# Patient Record
Sex: FEMALE | Race: OTHER | NOT HISPANIC OR LATINO
[De-identification: names, ages, dates, MRNs, and addresses within clinical notes are randomized per-mention and may not be internally consistent; named-entity substitution may affect disease eponyms.]

---

## 2017-03-24 ENCOUNTER — APPOINTMENT (OUTPATIENT)
Dept: CARDIOLOGY | Facility: CLINIC | Age: 62
End: 2017-03-24

## 2018-12-14 ENCOUNTER — NON-APPOINTMENT (OUTPATIENT)
Age: 63
End: 2018-12-14

## 2018-12-14 ENCOUNTER — APPOINTMENT (OUTPATIENT)
Dept: CARDIOLOGY | Facility: CLINIC | Age: 63
End: 2018-12-14
Payer: COMMERCIAL

## 2018-12-14 VITALS — HEART RATE: 56 BPM | OXYGEN SATURATION: 99 % | SYSTOLIC BLOOD PRESSURE: 95 MMHG | DIASTOLIC BLOOD PRESSURE: 49 MMHG

## 2018-12-14 VITALS — HEART RATE: 64 BPM | SYSTOLIC BLOOD PRESSURE: 97 MMHG | DIASTOLIC BLOOD PRESSURE: 53 MMHG | OXYGEN SATURATION: 99 %

## 2018-12-14 VITALS — HEART RATE: 64 BPM | DIASTOLIC BLOOD PRESSURE: 54 MMHG | OXYGEN SATURATION: 98 % | SYSTOLIC BLOOD PRESSURE: 102 MMHG

## 2018-12-14 VITALS
OXYGEN SATURATION: 99 % | DIASTOLIC BLOOD PRESSURE: 52 MMHG | HEIGHT: 66 IN | HEART RATE: 58 BPM | BODY MASS INDEX: 23.3 KG/M2 | RESPIRATION RATE: 12 BRPM | WEIGHT: 145 LBS | SYSTOLIC BLOOD PRESSURE: 101 MMHG

## 2018-12-14 VITALS — DIASTOLIC BLOOD PRESSURE: 53 MMHG | SYSTOLIC BLOOD PRESSURE: 95 MMHG | HEART RATE: 63 BPM | OXYGEN SATURATION: 97 %

## 2018-12-14 DIAGNOSIS — H81.10 BENIGN PAROXYSMAL VERTIGO, UNSPECIFIED EAR: ICD-10-CM

## 2018-12-14 DIAGNOSIS — R91.8 OTHER NONSPECIFIC ABNORMAL FINDING OF LUNG FIELD: ICD-10-CM

## 2018-12-14 DIAGNOSIS — R07.9 CHEST PAIN, UNSPECIFIED: ICD-10-CM

## 2018-12-14 DIAGNOSIS — Z84.89 FAMILY HISTORY OF OTHER SPECIFIED CONDITIONS: ICD-10-CM

## 2018-12-14 DIAGNOSIS — Z82.3 FAMILY HISTORY OF STROKE: ICD-10-CM

## 2018-12-14 PROCEDURE — 93040 RHYTHM ECG WITH REPORT: CPT | Mod: 59

## 2018-12-14 PROCEDURE — 36415 COLL VENOUS BLD VENIPUNCTURE: CPT

## 2018-12-14 PROCEDURE — 99205 OFFICE O/P NEW HI 60 MIN: CPT

## 2018-12-14 PROCEDURE — 93000 ELECTROCARDIOGRAM COMPLETE: CPT

## 2018-12-15 LAB
25(OH)D3 SERPL-MCNC: 16.2 NG/ML
ALBUMIN SERPL ELPH-MCNC: 4.7 G/DL
ALP BLD-CCNC: 63 U/L
ALT SERPL-CCNC: 17 U/L
ANION GAP SERPL CALC-SCNC: 12 MMOL/L
APPEARANCE: CLEAR
AST SERPL-CCNC: 25 U/L
BACTERIA: NEGATIVE
BASOPHILS # BLD AUTO: 0.02 K/UL
BASOPHILS NFR BLD AUTO: 0.3 %
BILIRUB SERPL-MCNC: 0.4 MG/DL
BILIRUBIN URINE: NEGATIVE
BLOOD URINE: NEGATIVE
BUN SERPL-MCNC: 10 MG/DL
CALCIUM SERPL-MCNC: 9.2 MG/DL
CHLORIDE SERPL-SCNC: 103 MMOL/L
CHOLEST SERPL-MCNC: 219 MG/DL
CHOLEST/HDLC SERPL: 4.1 RATIO
CO2 SERPL-SCNC: 27 MMOL/L
COLOR: YELLOW
CREAT SERPL-MCNC: 0.63 MG/DL
CREAT SPEC-SCNC: 134 MG/DL
EOSINOPHIL # BLD AUTO: 0.12 K/UL
EOSINOPHIL NFR BLD AUTO: 2.1 %
GLUCOSE QUALITATIVE U: NEGATIVE MG/DL
GLUCOSE SERPL-MCNC: 65 MG/DL
HBA1C MFR BLD HPLC: 5.8 %
HCT VFR BLD CALC: 40.2 %
HDLC SERPL-MCNC: 54 MG/DL
HGB BLD-MCNC: 13.1 G/DL
HYALINE CASTS: 1 /LPF
IMM GRANULOCYTES NFR BLD AUTO: 0.2 %
KETONES URINE: NEGATIVE
LDLC SERPL CALC-MCNC: 146 MG/DL
LEUKOCYTE ESTERASE URINE: NEGATIVE
LYMPHOCYTES # BLD AUTO: 1.87 K/UL
LYMPHOCYTES NFR BLD AUTO: 32 %
MAN DIFF?: NORMAL
MCHC RBC-ENTMCNC: 30.3 PG
MCHC RBC-ENTMCNC: 32.6 GM/DL
MCV RBC AUTO: 93.1 FL
MICROALBUMIN 24H UR DL<=1MG/L-MCNC: <1.2 MG/DL
MICROALBUMIN/CREAT 24H UR-RTO: NORMAL
MICROSCOPIC-UA: NORMAL
MONOCYTES # BLD AUTO: 0.46 K/UL
MONOCYTES NFR BLD AUTO: 7.9 %
NEUTROPHILS # BLD AUTO: 3.36 K/UL
NEUTROPHILS NFR BLD AUTO: 57.5 %
NITRITE URINE: NEGATIVE
PH URINE: 6
PLATELET # BLD AUTO: 276 K/UL
POTASSIUM SERPL-SCNC: 3.5 MMOL/L
PROT SERPL-MCNC: 7.7 G/DL
PROTEIN URINE: NEGATIVE MG/DL
RBC # BLD: 4.32 M/UL
RBC # FLD: 13.9 %
RED BLOOD CELLS URINE: 1 /HPF
SODIUM SERPL-SCNC: 142 MMOL/L
SPECIFIC GRAVITY URINE: 1.02
SQUAMOUS EPITHELIAL CELLS: 2 /HPF
T4 FREE SERPL-MCNC: 0.9 NG/DL
TRIGL SERPL-MCNC: 93 MG/DL
TSH SERPL-ACNC: 7.67 UIU/ML
UROBILINOGEN URINE: NEGATIVE MG/DL
WBC # FLD AUTO: 5.84 K/UL
WHITE BLOOD CELLS URINE: 0 /HPF

## 2018-12-15 RX ORDER — CYCLOBENZAPRINE HYDROCHLORIDE 10 MG/1
10 TABLET, FILM COATED ORAL
Qty: 21 | Refills: 0 | Status: COMPLETED | COMMUNITY
Start: 2018-02-17

## 2018-12-15 RX ORDER — AMOXICILLIN 500 MG/1
500 TABLET, FILM COATED ORAL
Qty: 12 | Refills: 0 | Status: COMPLETED | COMMUNITY
Start: 2018-08-22

## 2018-12-16 PROBLEM — R91.8 MULTIPLE LUNG NODULES ON CT: Status: ACTIVE | Noted: 2018-12-14

## 2018-12-16 PROBLEM — Z84.89 FAMILY HISTORY OF SEIZURES: Status: ACTIVE | Noted: 2018-12-14

## 2018-12-16 PROBLEM — Z82.3 FAMILY HISTORY OF STROKE: Status: ACTIVE | Noted: 2018-12-14

## 2018-12-16 NOTE — DISCUSSION/SUMMARY
[FreeTextEntry1] : \par WEIGHT GOALS:\par \par Category				BMI range - kg/m2	BMI Prime\par Very severely underweight		less than 15		less than 0.60	\par Severely underweight			from 15.0 to 16.0		from 0.60 to 0.64	\par Underweight				from 16.0 to 18.5		from 0.64 to 0.74	\par Normal (healthy weight)			from 18.5 to 25		from 0.74 to 1.0	\par Overweight				from 25 to 30		from 1.0 to 1.2	\par Obese Class I (Moderately obese)		from 30 to 35		from 1.2 to 1.4	\par Obese Class II (Severely obese)		from 35 to 40		from 1.4 to 1.6	\par Obese Class III (Very severely obese)	over 40			over 1.6	\par \par Your current weight is 145 lbs. Given current weight and height 5'6", your calculated body mass index (BMI) is 23.4 kg/sqm. Normal BMI is 18.5-25 kg/sqm. Thus current weight is in the normal category. Abdominal waist circumference is measured at the level of the umbilicus and is thus not a pants waist measurement. Your current abdominal waist circumference is 33 inches. Normal abdominal waist circumference is < 32 inches for women and < 37 inches for men\par \par SMOKING CESSATION:\par We all know the health risks of smoking, and most of us know that kicking the habit is the single biggest improvement to health a smoker can make. But that doesn’t make it any easier to kick the habit. Whether you’re a teen smoker or a lifetime pack-a-day smoker, quitting can be tough.\par To increase your chances of success, you need to be motivated, have social support, an understanding of what to expect, and a personal game plan. It is possible to learn how to replace your smoking habits, manage your cravings, and join the millions of people who have kicked the habit for good.\par Smoking tobacco is both a psychological habit and a physical addiction. The act of smoking is ingrained as a daily ritual and, at the same time, the nicotine from cigarettes provides a temporary, and addictive, high. Eliminating that regular fix of nicotine will cause your body to experience physical withdrawal symptoms and cravings. To successfully quit smoking, you’ll need to address both the habit and the addiction by changing your behavior and dealing with nicotine withdrawal symptoms.\par Relieving unpleasant and overwhelming feelings without cigarettes\par Managing unpleasant feelings such as stress, depression, loneliness, fear, and anxiety are some of the most common reasons why adults smoke. When you have a bad day, it can seem like your cigarettes are your only friend. Smoking can temporarily make feelings such as sadness, stress, anxiety, depression, and boredom evaporate into thin air. As much comfort as cigarettes provide, though, it’s important to remember that there are healthier (and more effective) ways to keep unpleasant feelings in check. These may include exercising, meditating, using sensory relaxation strategies, and practicing simple breathing exercises. \par For many people, an important aspect of quitting smoking is to find alternate ways to handle these difficult feelings without smoking. Even when cigarettes are no longer a part of your life, the painful and unpleasant feelings that may have prompted you to smoke in the past will still remain. So, it’s worth spending some time thinking about the different ways you intend to deal with stressful situations and the daily irritations that would normally have you reaching for a cigarette.\par Tailoring a personal game plan to your specific needs and desires can be a big help. List the reasons why you want to quit and then keep copies of the list in the places where you’d normally keep your cigarettes, such as in your jacket, purse, or car. Your reasons for quitting smoking might include:\par I will feel healthier and have more energy, whiter teeth and fresher breath.\par I will lower my risk for cancer, heart attacks, strokes, early death, cataracts, and skin wrinkling.\par I will make myself and my partner, friends, and family proud of me.\par I will no longer expose my children and others to the dangers of my second-hand smoke.\par I will have a healthier baby (If you or your partner is pregnant).\par I will have more money to spend.\par I won't have to worry: "When will I get to smoke next?"\par Questions to ask yourself\par To successfully detach from smoking, you will need to identify and address your smoking habits, the true nature of your dependency, and the techniques that work for you. These types of questions can help:\par Do you feel the need to smoke at every meal?\par Are you more of a social smoker?\par Is it a very bad addiction (more than a pack a day)? Or would a simple nicotine patch do the job?\par Is your cigarette smoking linked to other addictions, such as alcohol or gambling?\par Are you open to hypnotherapy and/or acupuncture?\par Are you someone who is open to talking about your addiction with a therapist or counselor?\par Are you interested in getting into a fitness program?\par Take the time to think of what kind of smoker you are, which moments of your life call for a cigarette, and why. This will help you to identify which tips, techniques or therapies may be most beneficial for you. \par Start your stop smoking plan with START\par S = Set a quit date.\par T = Tell family, friends, and co-workers that you plan to quit.\par A = Anticipate and plan for the challenges you'll face while quitting.\par R = Remove cigarettes and other tobacco products from your home, car, and work.\par T = Talk to your doctor about getting help to quit.\par \par After quitting, you may feel dizzy, restless, or even have strong headaches because you’re lacking the immediate release of sugar that comes from nicotine. You may also have a bigger appetite. These sugar-related cravings should only last a few days until your body adjusts so keep your sugar levels a bit higher than usual on those days by drinking plenty of juice (unless you’re a diabetic). It will help prevent the craving symptoms and help your body re-adjust back to normal.\par Tips for managing other cigarette cravings\par Cravings associated with meals\par For some smokers, ending a meal means lighting up, and the prospect of giving that up may appear daunting. TIP: replace that moment after a meal with something such as a piece of fruit, a (healthy) dessert, a square of chocolate, or a stick of gum.\par \par Alcohol and cigarettes\par Many people have a habit of smoking when they have an alcoholic drink. TIP: try non-alcoholic drinks, or try drinking only in bars, restaurant-bars, or friends’ houses where smoking inside is prohibited. Or try snacking on nuts and chips, or chewing on a straw or cocktail stick.\par \par Cravings associated with social smoking\par When friends, family, and co-workers smoke around you, it is doubly difficult to quit or avoid relapse. TIP: Your social circles need to know that you are changing your habits so talk about your decision to quit. Let them know they won’t be able to smoke when you’re in the car with them or taking a coffee break together. \par \par In your workplace, don’t take all your coffee breaks with smokers only, do something else instead, or find non-smokers to have your breaks with.\par Additional tips to deal with cravings and withdrawal symptoms\par Stay active: Keep yourself distracted and occupied, go for walks.\par Keep your hands/fingers busy: Squeeze balls, pencils, or paper clips are good substitutes to satisfy that need for tactile stimulation.\par Keep your mind busy: Read a book or magazine, listen to some music you love.\par Find an oral substitute: Keep other things around to pop in your mouth when you’re craving a cigarette.\par Good choices include mints, hard candy, carrot or celery sticks, gum, and sunflower seeds.\par Drink lots of water: Flushing toxins from your body minimizes withdrawal symptoms and helps cravings pass faster.\par Look for new ways to relax and to cope with depression or anxiety: There are a lot of ways to improve your mood without smoking. \par \par Finding the resources and support to quit smoking\par There are many different methods that have successfully helped people to quit smoking, including:\par Quitting smoking cold turkey.\par Systematically decreasing the number of cigarettes you smoke.\par Reducing your intake of nicotine gradually over time.\par Using nicotine replacement therapy or non-nicotine medications to reduce withdrawal symptoms.\par Utilizing nicotine support groups.\par Trying hypnosis, acupuncture, or counseling using cognitive behavioral techniques.\par You may be successful with the first method you try. More likely, you’ll have to try a number of different methods or a combination of treatments to find the ones that work best for you.\par \par Medication therapy\par Smoking cessation medications can ease withdrawal symptoms and reduce cravings, and are most effective when used as part of a comprehensive stop smoking program monitored by your physician. Talk to your doctor about your options and whether an anti-smoking medication is right for you. [place] Food and Drug Administration (FDA) approved options are: \par \par Nicotine Replacement Therapy\par Nicotine replacement therapy involves "replacing" cigarettes with other nicotine substitutes, such as nicotine gum or a nicotine patch. It works by delivering small and steady doses of nicotine into the body to relieve some of the withdrawal symptoms without the tars and poisonous gases found in cigarettes. This type of treatment helps smokers focus on breaking their psychological addiction and makes it easier to concentrate on learning new behaviors and coping skills.\par \par Non-Nicotine Medication\par These medications help you stop smoking by reducing cravings and withdrawal symptoms without the use of nicotine. Medications such as bupropion (Zyban) and varenicline (Chantix) are intended for short-term use only.\par \par Non-medication therapies\par There are several things you can do to stop smoking that don’t involve nicotine replacement therapy or prescription medications:\par Hypnosis\par A popular option that has produced good results. Forget anything you may have seen from stage hypnotists, hypnosis works by getting you into a deeply relaxed state where you are open to suggestions that strengthen your resolve to quit smoking and increase your negative feelings toward cigarettes. Ask your doctor to recommend a qualified smoking cessation hypnotherapist in your area or refer to the American Society of Clinical Hypnosis (ASCH) for guidelines on selecting a qualified professional.\par Acupuncture\par One of the oldest known medical techniques, acupuncture is believed to work by triggering the release of endorphins (natural pain relievers) that allow the body to relax. As a smoking cessation aid, acupuncture can be helpful in managing smoking withdrawal symptoms. Ask your doctor for a referral or search for a local practitioner at the American Association [employer] and [place] [employer] (AAAOM).\par Behavioral Therapy\par Nicotine addiction is related to the habitual behaviors (the “rituals”) involved in smoking. Behavior therapy focuses on learning new coping skills and breaking those habits. The American [employer] Association offers a free online smoking cessation program that focuses on behavioral change. To find a local behavioral therapist, check with your doctor or search at the Association for Behavioral and Cognitive Therapies (ABCT).\par Motivational Therapies\par Self-help books and websites can provide a number of ways to motivate yourself to quit smoking. One well known example is calculating the monetary savings. Some people have been able to find the motivation to quit just by calculating how much money they will save after they quit. It may be enough to pay for a summer vacation.\par \par [facility]\par [address]\[place] [address]\par [phone]\par [email or web address]/[email or web address]\par \par GLYCEMIC INDEX:\par Foods can be measured by how much they are likely to raise blood sugar. This is called the glycemic index. We want you to eat mostly foods that have a low glycemic index. \par Fruit: choose cherries, grapefruit, prunes, dried apricots, apples, peaches, pears, blueberries, strawberries, oranges, and grapes.  \par Breads and other starches: Choose pumpernickel, rye, sourdough, or stone-ground whole wheat bread. For cereal, choose bran flakes, oat flakes, and oatmeal. For rice, use long-grained white rice. Most pasta is fairly low on the glycemic index; whole wheat or egg pasta is the lowest. Choose new or sweet potatoes and yams.  \par Dairy products: Dairy tends to be fairly low on the glycemic index because of the protein and fat in it. Premium ice cream is lower on the glycemic index than low-fat ice cream.  \par Salty snacks: Hummus, peanuts, walnuts, and cashews are all good choices.  \par Sweets: Most sweets are high on the glycemic index, so make them special treats only. Ones that have protein and fat in them tend to be a bit lower. Milk chocolate or peanut candies are good choices. Pound and sponge cakes are lower on the glycemic index than other types of cakes. For cookies, choose peanut butter, chocolate chip, butter, and oatmeal cookies. For a breakfast treat, choose scones or oatmeal muffins. \par Beans: Choose angely, chickpeas (garbanzo beans), lentils, split peas, lima beans, and kidney beans. \par Meat and vegetables are low on the glycemic index. Soups and stews made with meat or vegetables are also good.\par

## 2018-12-16 NOTE — PHYSICAL EXAM
[General Appearance - Well Developed] : well developed [Normal Appearance] : normal appearance [Well Groomed] : well groomed [General Appearance - Well Nourished] : well nourished [No Deformities] : no deformities [General Appearance - In No Acute Distress] : no acute distress [Normal Conjunctiva] : the conjunctiva exhibited no abnormalities [Eyelids - No Xanthelasma] : the eyelids demonstrated no xanthelasmas [Normal Oral Mucosa] : normal oral mucosa [No Oral Pallor] : no oral pallor [No Oral Cyanosis] : no oral cyanosis [Normal Jugular Venous A Waves Present] : normal jugular venous A waves present [Normal Jugular Venous V Waves Present] : normal jugular venous V waves present [No Jugular Venous Pena A Waves] : no jugular venous pena A waves [5th Left ICS - MCL] : palpated at the 5th LICS in the midclavicular line [Normal] : normal [No Precordial Heave] : no precordial heave was noted [Normal Rate] : normal [Rhythm Regular] : regular [Normal S1] : normal S1 [Normal S2] : normal S2 [No Gallop] : no gallop heard [No Murmur] : no murmurs heard [2+] : left 2+ [No Abnormalities] : the abdominal aorta was not enlarged and no bruit was heard [No Pitting Edema] : no pitting edema present [Abnormal Walk] : normal gait [Gait - Sufficient For Exercise Testing] : the gait was sufficient for exercise testing [Nail Clubbing] : no clubbing of the fingernails [Cyanosis, Localized] : no localized cyanosis [Petechial Hemorrhages (___cm)] : no petechial hemorrhages [Skin Color & Pigmentation] : normal skin color and pigmentation [No Venous Stasis] : no venous stasis [Skin Lesions] : no skin lesions [No Skin Ulcers] : no skin ulcer [No Xanthoma] : no  xanthoma was observed [Oriented To Time, Place, And Person] : oriented to person, place, and time [Affect] : the affect was normal [Mood] : the mood was normal [No Anxiety] : not feeling anxious [Abdomen Soft] : soft [Abdomen Tenderness] : non-tender [] : no hepato-splenomegaly [Abdomen Mass (___ Cm)] : no abdominal mass palpated [Prolonged Exp Time] : with normal expiratory time [Increased E/I Ratio] : with normal expiratory/inspiratory ratio  [Apical Thrill] : no thrill palpable at the apex [Click] : no click [Pericardial Rub] : no pericardial rub [Right Carotid Bruit] : no bruit heard over the right carotid [Left Carotid Bruit] : no bruit heard over the left carotid [Rt] : no varicose veins of the right leg [Lt] : no varicose veins of the left leg [FreeTextEntry1] : ankle circumference 7 inches right and 7.25 inches right

## 2018-12-16 NOTE — ADDENDUM
[FreeTextEntry1] : I spent 60 minutes face to face time with the patient, from 11:00 to 12:00 on 12/14/18. Thirty minutes were devoted to patient counseling as outlined above in the End of Visit section. Prior to this visit, I obtained and reviewed echocardiogram report done 11/13/17.
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
## 2018-12-16 NOTE — REASON FOR VISIT
[Follow-Up - Clinic] : a clinic follow-up of [Abnormal Test Result] : an abnormal test result [Chest Pain] : chest pain [Hyperlipidemia] : hyperlipidemia

## 2019-01-18 ENCOUNTER — APPOINTMENT (OUTPATIENT)
Dept: CV DIAGNOSTICS | Facility: HOSPITAL | Age: 64
End: 2019-01-18

## 2019-04-22 ENCOUNTER — APPOINTMENT (OUTPATIENT)
Dept: CV DIAGNOSTICS | Facility: HOSPITAL | Age: 64
End: 2019-04-22

## 2019-05-24 ENCOUNTER — APPOINTMENT (OUTPATIENT)
Dept: CV DIAGNOSTICS | Facility: HOSPITAL | Age: 64
End: 2019-05-24
Payer: COMMERCIAL

## 2019-05-24 ENCOUNTER — OUTPATIENT (OUTPATIENT)
Dept: OUTPATIENT SERVICES | Facility: HOSPITAL | Age: 64
LOS: 1 days | End: 2019-05-24

## 2019-05-24 DIAGNOSIS — R07.89 OTHER CHEST PAIN: ICD-10-CM

## 2019-05-24 DIAGNOSIS — E78.5 HYPERLIPIDEMIA, UNSPECIFIED: ICD-10-CM

## 2019-05-24 DIAGNOSIS — F17.200 NICOTINE DEPENDENCE, UNSPECIFIED, UNCOMPLICATED: ICD-10-CM

## 2019-05-24 DIAGNOSIS — R73.09 OTHER ABNORMAL GLUCOSE: ICD-10-CM

## 2019-05-24 DIAGNOSIS — R06.09 OTHER FORMS OF DYSPNEA: ICD-10-CM

## 2019-05-24 PROCEDURE — 78452 HT MUSCLE IMAGE SPECT MULT: CPT | Mod: 26

## 2019-05-24 PROCEDURE — 93018 CV STRESS TEST I&R ONLY: CPT | Mod: GC

## 2019-05-24 PROCEDURE — 93016 CV STRESS TEST SUPVJ ONLY: CPT | Mod: GC

## 2019-06-04 ENCOUNTER — RESULT REVIEW (OUTPATIENT)
Age: 64
End: 2019-06-04

## 2019-06-04 ENCOUNTER — RESULT CHARGE (OUTPATIENT)
Age: 64
End: 2019-06-04

## 2019-11-08 ENCOUNTER — APPOINTMENT (OUTPATIENT)
Dept: CARDIOLOGY | Facility: CLINIC | Age: 64
End: 2019-11-08
Payer: COMMERCIAL

## 2019-11-08 ENCOUNTER — NON-APPOINTMENT (OUTPATIENT)
Age: 64
End: 2019-11-08

## 2019-11-08 VITALS — OXYGEN SATURATION: 100 % | HEART RATE: 62 BPM | SYSTOLIC BLOOD PRESSURE: 97 MMHG | DIASTOLIC BLOOD PRESSURE: 45 MMHG

## 2019-11-08 VITALS
DIASTOLIC BLOOD PRESSURE: 52 MMHG | OXYGEN SATURATION: 100 % | SYSTOLIC BLOOD PRESSURE: 103 MMHG | HEIGHT: 66.75 IN | HEART RATE: 62 BPM | RESPIRATION RATE: 12 BRPM | WEIGHT: 147 LBS | BODY MASS INDEX: 23.07 KG/M2

## 2019-11-08 VITALS — DIASTOLIC BLOOD PRESSURE: 52 MMHG | OXYGEN SATURATION: 100 % | SYSTOLIC BLOOD PRESSURE: 102 MMHG | HEART RATE: 64 BPM

## 2019-11-08 VITALS — OXYGEN SATURATION: 99 % | SYSTOLIC BLOOD PRESSURE: 95 MMHG | HEART RATE: 68 BPM | DIASTOLIC BLOOD PRESSURE: 49 MMHG

## 2019-11-08 VITALS — SYSTOLIC BLOOD PRESSURE: 102 MMHG | HEART RATE: 67 BPM | DIASTOLIC BLOOD PRESSURE: 56 MMHG | OXYGEN SATURATION: 99 %

## 2019-11-08 VITALS — OXYGEN SATURATION: 98 % | SYSTOLIC BLOOD PRESSURE: 106 MMHG | DIASTOLIC BLOOD PRESSURE: 59 MMHG | HEART RATE: 68 BPM

## 2019-11-08 VITALS — SYSTOLIC BLOOD PRESSURE: 93 MMHG | HEART RATE: 70 BPM | DIASTOLIC BLOOD PRESSURE: 56 MMHG | OXYGEN SATURATION: 100 %

## 2019-11-08 VITALS — SYSTOLIC BLOOD PRESSURE: 129 MMHG | DIASTOLIC BLOOD PRESSURE: 69 MMHG | OXYGEN SATURATION: 99 % | HEART RATE: 65 BPM

## 2019-11-08 DIAGNOSIS — Z00.00 ENCOUNTER FOR GENERAL ADULT MEDICAL EXAMINATION W/OUT ABNORMAL FINDINGS: ICD-10-CM

## 2019-11-08 DIAGNOSIS — K21.0 GASTRO-ESOPHAGEAL REFLUX DISEASE WITH ESOPHAGITIS: ICD-10-CM

## 2019-11-08 LAB
25(OH)D3 SERPL-MCNC: 18.1 NG/ML
ALBUMIN SERPL ELPH-MCNC: 4.8 G/DL
ALP BLD-CCNC: 71 U/L
ALT SERPL-CCNC: 24 U/L
APPEARANCE: CLEAR
AST SERPL-CCNC: 29 U/L
BACTERIA: NEGATIVE
BASOPHILS # BLD AUTO: 0.06 K/UL
BASOPHILS NFR BLD AUTO: 0.9 %
BILIRUB SERPL-MCNC: 0.3 MG/DL
BILIRUBIN URINE: NEGATIVE
BLOOD URINE: NEGATIVE
BUN SERPL-MCNC: 13 MG/DL
CALCIUM SERPL-MCNC: 9.7 MG/DL
CHLORIDE SERPL-SCNC: 101 MMOL/L
CO2 SERPL-SCNC: 26 MMOL/L
COLOR: YELLOW
CREAT SERPL-MCNC: 0.61 MG/DL
CREAT SPEC-SCNC: 147 MG/DL
CRP SERPL HS-MCNC: 0.95 MG/L
EOSINOPHIL # BLD AUTO: 0.39 K/UL
EOSINOPHIL NFR BLD AUTO: 5.8 %
ESTIMATED AVERAGE GLUCOSE: 117 MG/DL
GLUCOSE QUALITATIVE U: NEGATIVE
GLUCOSE SERPL-MCNC: 82 MG/DL
HBA1C MFR BLD HPLC: 5.7 %
HCT VFR BLD CALC: 40 %
HGB BLD-MCNC: 12.9 G/DL
HYALINE CASTS: 4 /LPF
IMM GRANULOCYTES NFR BLD AUTO: 0.1 %
KETONES URINE: NEGATIVE
LEUKOCYTE ESTERASE URINE: NEGATIVE
LYMPHOCYTES # BLD AUTO: 1.57 K/UL
LYMPHOCYTES NFR BLD AUTO: 23.3 %
MAN DIFF?: NORMAL
MCHC RBC-ENTMCNC: 30.6 PG
MCHC RBC-ENTMCNC: 32.3 GM/DL
MCV RBC AUTO: 94.8 FL
MICROALBUMIN 24H UR DL<=1MG/L-MCNC: <1.2 MG/DL
MICROALBUMIN/CREAT 24H UR-RTO: NORMAL MG/G
MICROSCOPIC-UA: NORMAL
MONOCYTES # BLD AUTO: 0.63 K/UL
MONOCYTES NFR BLD AUTO: 9.3 %
NEUTROPHILS # BLD AUTO: 4.08 K/UL
NEUTROPHILS NFR BLD AUTO: 60.6 %
NITRITE URINE: NEGATIVE
PH URINE: 6.5
PLATELET # BLD AUTO: 275 K/UL
POTASSIUM SERPL-SCNC: 4.1 MMOL/L
PROT SERPL-MCNC: 7.9 G/DL
PROTEIN URINE: NEGATIVE
RBC # BLD: 4.22 M/UL
RBC # FLD: 12.8 %
RED BLOOD CELLS URINE: 2 /HPF
SODIUM SERPL-SCNC: 142 MMOL/L
SPECIFIC GRAVITY URINE: 1.02
SQUAMOUS EPITHELIAL CELLS: 3 /HPF
T4 FREE SERPL-MCNC: 1 NG/DL
TSH SERPL-ACNC: 5.36 UIU/ML
UROBILINOGEN URINE: NORMAL
WBC # FLD AUTO: 6.74 K/UL
WHITE BLOOD CELLS URINE: 0 /HPF

## 2019-11-08 PROCEDURE — 93040 RHYTHM ECG WITH REPORT: CPT | Mod: 59

## 2019-11-08 PROCEDURE — 36415 COLL VENOUS BLD VENIPUNCTURE: CPT

## 2019-11-08 PROCEDURE — 99215 OFFICE O/P EST HI 40 MIN: CPT

## 2019-11-08 PROCEDURE — 93000 ELECTROCARDIOGRAM COMPLETE: CPT

## 2019-11-08 RX ORDER — ATORVASTATIN CALCIUM 20 MG/1
20 TABLET, FILM COATED ORAL
Qty: 90 | Refills: 3 | Status: DISCONTINUED | COMMUNITY
Start: 2018-12-16 | End: 2019-11-08

## 2019-11-08 RX ORDER — UBIDECARENONE 200 MG
200 CAPSULE ORAL
Qty: 90 | Refills: 0 | Status: DISCONTINUED | COMMUNITY
Start: 2018-12-16 | End: 2019-11-08

## 2019-11-08 NOTE — DISCUSSION/SUMMARY
[FreeTextEntry1] : \par WEIGHT GOALS:\par \par Category				BMI range - kg/m2	BMI Prime\par Very severely underweight		less than 15		less than 0.60	\par Severely underweight			from 15.0 to 16.0		from 0.60 to 0.64	\par Underweight				from 16.0 to 18.5		from 0.64 to 0.74	\par Normal (healthy weight)			from 18.5 to 25		from 0.74 to 1.0	\par Overweight				from 25 to 30		from 1.0 to 1.2	\par Obese Class I (Moderately obese)		from 30 to 35		from 1.2 to 1.4	\par Obese Class II (Severely obese)		from 35 to 40		from 1.4 to 1.6	\par Obese Class III (Very severely obese)	over 40			over 1.6	\par \par Your current weight is 147 lbs (145 lbs on 12/14/18). Given current weight and height 5'6.75", your calculated body mass index (BMI) is 23.2 kg/sqm. Normal BMI is 18.5-25 kg/sqm. Thus current weight is in the normal category. Abdominal waist circumference is measured at the level of the umbilicus and is thus not a pants waist measurement. Your current abdominal waist circumference is 29 inches. Normal abdominal waist circumference is < 32 inches for women and < 37 inches for men\par \par SMOKING CESSATION:\par We all know the health risks of smoking, and most of us know that kicking the habit is the single biggest improvement to health a smoker can make. But that doesn’t make it any easier to kick the habit. Whether you’re a teen smoker or a lifetime pack-a-day smoker, quitting can be tough.\par To increase your chances of success, you need to be motivated, have social support, an understanding of what to expect, and a personal game plan. It is possible to learn how to replace your smoking habits, manage your cravings, and join the millions of people who have kicked the habit for good.\par Smoking tobacco is both a psychological habit and a physical addiction. The act of smoking is ingrained as a daily ritual and, at the same time, the nicotine from cigarettes provides a temporary, and addictive, high. Eliminating that regular fix of nicotine will cause your body to experience physical withdrawal symptoms and cravings. To successfully quit smoking, you’ll need to address both the habit and the addiction by changing your behavior and dealing with nicotine withdrawal symptoms.\par Relieving unpleasant and overwhelming feelings without cigarettes\par Managing unpleasant feelings such as stress, depression, loneliness, fear, and anxiety are some of the most common reasons why adults smoke. When you have a bad day, it can seem like your cigarettes are your only friend. Smoking can temporarily make feelings such as sadness, stress, anxiety, depression, and boredom evaporate into thin air. As much comfort as cigarettes provide, though, it’s important to remember that there are healthier (and more effective) ways to keep unpleasant feelings in check. These may include exercising, meditating, using sensory relaxation strategies, and practicing simple breathing exercises. \par For many people, an important aspect of quitting smoking is to find alternate ways to handle these difficult feelings without smoking. Even when cigarettes are no longer a part of your life, the painful and unpleasant feelings that may have prompted you to smoke in the past will still remain. So, it’s worth spending some time thinking about the different ways you intend to deal with stressful situations and the daily irritations that would normally have you reaching for a cigarette.\par Tailoring a personal game plan to your specific needs and desires can be a big help. List the reasons why you want to quit and then keep copies of the list in the places where you’d normally keep your cigarettes, such as in your jacket, purse, or car. Your reasons for quitting smoking might include:\par I will feel healthier and have more energy, whiter teeth and fresher breath.\par I will lower my risk for cancer, heart attacks, strokes, early death, cataracts, and skin wrinkling.\par I will make myself and my partner, friends, and family proud of me.\par I will no longer expose my children and others to the dangers of my second-hand smoke.\par I will have a healthier baby (If you or your partner is pregnant).\par I will have more money to spend.\par I won't have to worry: "When will I get to smoke next?"\par Questions to ask yourself\par To successfully detach from smoking, you will need to identify and address your smoking habits, the true nature of your dependency, and the techniques that work for you. These types of questions can help:\par Do you feel the need to smoke at every meal?\par Are you more of a social smoker?\par Is it a very bad addiction (more than a pack a day)? Or would a simple nicotine patch do the job?\par Is your cigarette smoking linked to other addictions, such as alcohol or gambling?\par Are you open to hypnotherapy and/or acupuncture?\par Are you someone who is open to talking about your addiction with a therapist or counselor?\par Are you interested in getting into a fitness program?\par Take the time to think of what kind of smoker you are, which moments of your life call for a cigarette, and why. This will help you to identify which tips, techniques or therapies may be most beneficial for you. \par Start your stop smoking plan with START\par S = Set a quit date.\par T = Tell family, friends, and co-workers that you plan to quit.\par A = Anticipate and plan for the challenges you'll face while quitting.\par R = Remove cigarettes and other tobacco products from your home, car, and work.\par T = Talk to your doctor about getting help to quit.\par \par After quitting, you may feel dizzy, restless, or even have strong headaches because you’re lacking the immediate release of sugar that comes from nicotine. You may also have a bigger appetite. These sugar-related cravings should only last a few days until your body adjusts so keep your sugar levels a bit higher than usual on those days by drinking plenty of juice (unless you’re a diabetic). It will help prevent the craving symptoms and help your body re-adjust back to normal.\par Tips for managing other cigarette cravings\par Cravings associated with meals\par For some smokers, ending a meal means lighting up, and the prospect of giving that up may appear daunting. TIP: replace that moment after a meal with something such as a piece of fruit, a (healthy) dessert, a square of chocolate, or a stick of gum.\par \par Alcohol and cigarettes\par Many people have a habit of smoking when they have an alcoholic drink. TIP: try non-alcoholic drinks, or try drinking only in bars, restaurant-bars, or friends’ houses where smoking inside is prohibited. Or try snacking on nuts and chips, or chewing on a straw or cocktail stick.\par \par Cravings associated with social smoking\par When friends, family, and co-workers smoke around you, it is doubly difficult to quit or avoid relapse. TIP: Your social circles need to know that you are changing your habits so talk about your decision to quit. Let them know they won’t be able to smoke when you’re in the car with them or taking a coffee break together. \par \par In your workplace, don’t take all your coffee breaks with smokers only, do something else instead, or find non-smokers to have your breaks with.\par Additional tips to deal with cravings and withdrawal symptoms\par Stay active: Keep yourself distracted and occupied, go for walks.\par Keep your hands/fingers busy: Squeeze balls, pencils, or paper clips are good substitutes to satisfy that need for tactile stimulation.\par Keep your mind busy: Read a book or magazine, listen to some music you love.\par Find an oral substitute: Keep other things around to pop in your mouth when you’re craving a cigarette.\par Good choices include mints, hard candy, carrot or celery sticks, gum, and sunflower seeds.\par Drink lots of water: Flushing toxins from your body minimizes withdrawal symptoms and helps cravings pass faster.\par Look for new ways to relax and to cope with depression or anxiety: There are a lot of ways to improve your mood without smoking. \par \par Finding the resources and support to quit smoking\par There are many different methods that have successfully helped people to quit smoking, including:\par Quitting smoking cold turkey.\par Systematically decreasing the number of cigarettes you smoke.\par Reducing your intake of nicotine gradually over time.\par Using nicotine replacement therapy or non-nicotine medications to reduce withdrawal symptoms.\par Utilizing nicotine support groups.\par Trying hypnosis, acupuncture, or counseling using cognitive behavioral techniques.\par You may be successful with the first method you try. More likely, you’ll have to try a number of different methods or a combination of treatments to find the ones that work best for you.\par \par Medication therapy\par Smoking cessation medications can ease withdrawal symptoms and reduce cravings, and are most effective when used as part of a comprehensive stop smoking program monitored by your physician. Talk to your doctor about your options and whether an anti-smoking medication is right for you. U.S. Food and Drug Administration (FDA) approved options are: \par \par Nicotine Replacement Therapy\par Nicotine replacement therapy involves "replacing" cigarettes with other nicotine substitutes, such as nicotine gum or a nicotine patch. It works by delivering small and steady doses of nicotine into the body to relieve some of the withdrawal symptoms without the tars and poisonous gases found in cigarettes. This type of treatment helps smokers focus on breaking their psychological addiction and makes it easier to concentrate on learning new behaviors and coping skills.\par \par Non-Nicotine Medication\par These medications help you stop smoking by reducing cravings and withdrawal symptoms without the use of nicotine. Medications such as bupropion (Zyban) and varenicline (Chantix) are intended for short-term use only.\par \par Non-medication therapies\par There are several things you can do to stop smoking that don’t involve nicotine replacement therapy or prescription medications:\par Hypnosis\par A popular option that has produced good results. Forget anything you may have seen from stage hypnotists, hypnosis works by getting you into a deeply relaxed state where you are open to suggestions that strengthen your resolve to quit smoking and increase your negative feelings toward cigarettes. Ask your doctor to recommend a qualified smoking cessation hypnotherapist in your area or refer to the American Society of Clinical Hypnosis (ASCH) for guidelines on selecting a qualified professional.\par Acupuncture\par One of the oldest known medical techniques, acupuncture is believed to work by triggering the release of endorphins (natural pain relievers) that allow the body to relax. As a smoking cessation aid, acupuncture can be helpful in managing smoking withdrawal symptoms. Ask your doctor for a referral or search for a local practitioner at the American Association of Acupuncture and [place] [facility] (AAAOM).\par Behavioral Therapy\par Nicotine addiction is related to the habitual behaviors (the “rituals”) involved in smoking. Behavior therapy focuses on learning new coping skills and breaking those habits. The American Lung Association offers a free online smoking cessation program that focuses on behavioral change. To find a local behavioral therapist, check with your doctor or search at the Association for Behavioral and Cognitive Therapies (ABCT).\par Motivational Therapies\par Self-help books and websites can provide a number of ways to motivate yourself to quit smoking. One well known example is calculating the monetary savings. Some people have been able to find the motivation to quit just by calculating how much money they will save after they quit. It may be enough to pay for a summer vacation.\par \par [facility]\par [address]\[place] [address]\par [phone]\par [email or web address]\par \par GLYCEMIC INDEX:\par Foods can be measured by how much they are likely to raise blood sugar. This is called the glycemic index. We want you to eat mostly foods that have a low glycemic index. \par Fruit: choose cherries, grapefruit, prunes, dried apricots, apples, peaches, pears, blueberries, strawberries, oranges, and grapes.  \par Breads and other starches: Choose pumpernickel, rye, sourdough, or stone-ground whole wheat bread. For cereal, choose bran flakes, oat flakes, and oatmeal. For rice, use long-grained white rice. Most pasta is fairly low on the glycemic index; whole wheat or egg pasta is the lowest. Choose new or sweet potatoes and yams.  \par Dairy products: Dairy tends to be fairly low on the glycemic index because of the protein and fat in it. Premium ice cream is lower on the glycemic index than low-fat ice cream.  \par Salty snacks: Hummus, peanuts, walnuts, and cashews are all good choices.  \par Sweets: Most sweets are high on the glycemic index, so make them special treats only. Ones that have protein and fat in them tend to be a bit lower. Milk chocolate or peanut candies are good choices. Pound and sponge cakes are lower on the glycemic index than other types of cakes. For cookies, choose peanut butter, chocolate chip, butter, and oatmeal cookies. For a breakfast treat, choose scones or oatmeal muffins. \par Beans: Choose angely, chickpeas (garbanzo beans), lentils, split peas, lima beans, and kidney beans. \par Meat and vegetables are low on the glycemic index. Soups and stews made with meat or vegetables are also good.\par

## 2019-11-08 NOTE — PHYSICAL EXAM
[General Appearance - Well Developed] : well developed [Normal Appearance] : normal appearance [Well Groomed] : well groomed [General Appearance - Well Nourished] : well nourished [No Deformities] : no deformities [General Appearance - In No Acute Distress] : no acute distress [Eyelids - No Xanthelasma] : the eyelids demonstrated no xanthelasmas [Normal Oral Mucosa] : normal oral mucosa [Normal Conjunctiva] : the conjunctiva exhibited no abnormalities [No Oral Pallor] : no oral pallor [Normal Jugular Venous A Waves Present] : normal jugular venous A waves present [No Oral Cyanosis] : no oral cyanosis [Normal Jugular Venous V Waves Present] : normal jugular venous V waves present [No Jugular Venous Pena A Waves] : no jugular venous pena A waves [Abdomen Soft] : soft [Abdomen Tenderness] : non-tender [Abdomen Mass (___ Cm)] : no abdominal mass palpated [Abnormal Walk] : normal gait [Gait - Sufficient For Exercise Testing] : the gait was sufficient for exercise testing [Nail Clubbing] : no clubbing of the fingernails [Cyanosis, Localized] : no localized cyanosis [Petechial Hemorrhages (___cm)] : no petechial hemorrhages [Skin Color & Pigmentation] : normal skin color and pigmentation [] : no rash [No Venous Stasis] : no venous stasis [No Skin Ulcers] : no skin ulcer [Skin Lesions] : no skin lesions [No Xanthoma] : no  xanthoma was observed [Oriented To Time, Place, And Person] : oriented to person, place, and time [Affect] : the affect was normal [Mood] : the mood was normal [No Anxiety] : not feeling anxious [Normal] : normal [No Precordial Heave] : no precordial heave was noted [5th Left ICS - MCL] : palpated at the 5th LICS in the midclavicular line [Normal Rate] : normal [Rhythm Regular] : regular [Normal S1] : normal S1 [Normal S2] : normal S2 [No Gallop] : no gallop heard [No Murmur] : no murmurs heard [2+] : left 2+ [No Abnormalities] : the abdominal aorta was not enlarged and no bruit was heard [No Pitting Edema] : no pitting edema present [Prolonged Exp Time] : with normal expiratory time [Increased E/I Ratio] : with normal expiratory/inspiratory ratio  [FreeTextEntry1] : there was slight intermittent end expiratory wheeze with forced exhalation [Apical Thrill] : no thrill palpable at the apex [Left Carotid Bruit] : no bruit heard over the left carotid [Pericardial Rub] : no pericardial rub [Click] : no click [Right Carotid Bruit] : no bruit heard over the right carotid [Rt] : no varicose veins of the right leg [Lt] : no varicose veins of the left leg

## 2019-11-08 NOTE — REASON FOR VISIT
[Abnormal Test Result] : an abnormal test result [Follow-Up - Clinic] : a clinic follow-up of [Hyperlipidemia] : hyperlipidemia [Chest Pain] : chest pain

## 2019-11-08 NOTE — ADDENDUM
[FreeTextEntry1] : I spent 60 minutes face to face time with the patient, from 11:00 to 12:00 on 11/8/19. Thirty minutes were devoted to patient counseling as outlined above in the End of Visit section. Prior to this visit, I obtained and reviewed various test results scanned into EMR. I personally reviewed images and findings from treadmill nuclear stress test 5/24/19.
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
## 2019-11-11 LAB
CHOLEST SERPL-MCNC: 238 MG/DL
CHOLEST/HDLC SERPL: 5.5 RATIO
HDLC SERPL-MCNC: 43 MG/DL
LDLC SERPL CALC-MCNC: 166 MG/DL
TRIGL SERPL-MCNC: 144 MG/DL

## 2019-11-12 RX ORDER — CLARITHROMYCIN 500 MG/1
500 TABLET, FILM COATED ORAL
Qty: 28 | Refills: 0 | Status: COMPLETED | COMMUNITY
Start: 2019-10-14

## 2019-11-12 RX ORDER — PANTOPRAZOLE 40 MG/1
40 TABLET, DELAYED RELEASE ORAL DAILY
Refills: 0 | Status: DISCONTINUED | COMMUNITY
Start: 2019-11-08 | End: 2019-11-12

## 2019-11-12 RX ORDER — AMOXICILLIN 500 MG/1
500 CAPSULE ORAL
Qty: 56 | Refills: 0 | Status: COMPLETED | COMMUNITY
Start: 2019-10-14

## 2020-05-19 ENCOUNTER — TRANSCRIPTION ENCOUNTER (OUTPATIENT)
Age: 65
End: 2020-05-19

## 2020-11-03 ENCOUNTER — LABORATORY RESULT (OUTPATIENT)
Age: 65
End: 2020-11-03

## 2020-11-04 ENCOUNTER — APPOINTMENT (OUTPATIENT)
Dept: CARDIOLOGY | Facility: CLINIC | Age: 65
End: 2020-11-04
Payer: COMMERCIAL

## 2020-11-04 ENCOUNTER — NON-APPOINTMENT (OUTPATIENT)
Age: 65
End: 2020-11-04

## 2020-11-04 VITALS — DIASTOLIC BLOOD PRESSURE: 52 MMHG | HEART RATE: 65 BPM | SYSTOLIC BLOOD PRESSURE: 96 MMHG | OXYGEN SATURATION: 99 %

## 2020-11-04 VITALS — SYSTOLIC BLOOD PRESSURE: 95 MMHG | OXYGEN SATURATION: 99 % | DIASTOLIC BLOOD PRESSURE: 47 MMHG | HEART RATE: 63 BPM

## 2020-11-04 VITALS — WEIGHT: 148 LBS | BODY MASS INDEX: 23.36 KG/M2

## 2020-11-04 VITALS — HEART RATE: 70 BPM | SYSTOLIC BLOOD PRESSURE: 97 MMHG | OXYGEN SATURATION: 98 % | DIASTOLIC BLOOD PRESSURE: 51 MMHG

## 2020-11-04 VITALS
SYSTOLIC BLOOD PRESSURE: 104 MMHG | WEIGHT: 148 LBS | RESPIRATION RATE: 12 BRPM | HEART RATE: 71 BPM | TEMPERATURE: 97.8 F | OXYGEN SATURATION: 99 % | DIASTOLIC BLOOD PRESSURE: 55 MMHG | HEIGHT: 66.75 IN | BODY MASS INDEX: 23.23 KG/M2

## 2020-11-04 VITALS — OXYGEN SATURATION: 99 % | HEART RATE: 63 BPM | SYSTOLIC BLOOD PRESSURE: 98 MMHG | DIASTOLIC BLOOD PRESSURE: 46 MMHG

## 2020-11-04 PROCEDURE — 93040 RHYTHM ECG WITH REPORT: CPT | Mod: 59

## 2020-11-04 PROCEDURE — 99215 OFFICE O/P EST HI 40 MIN: CPT

## 2020-11-04 PROCEDURE — 99072 ADDL SUPL MATRL&STAF TM PHE: CPT

## 2020-11-04 PROCEDURE — 93000 ELECTROCARDIOGRAM COMPLETE: CPT

## 2020-11-04 PROCEDURE — 36415 COLL VENOUS BLD VENIPUNCTURE: CPT

## 2020-11-04 RX ORDER — PREDNISOLONE ACETATE 10 MG/ML
1 SUSPENSION/ DROPS OPHTHALMIC
Qty: 5 | Refills: 0 | Status: DISCONTINUED | COMMUNITY
Start: 2019-11-05 | End: 2020-11-04

## 2020-11-04 RX ORDER — MULTIVITAMIN
CAPSULE ORAL
Qty: 90 | Refills: 3 | Status: DISCONTINUED | COMMUNITY
Start: 2018-12-14 | End: 2020-11-04

## 2020-11-04 RX ORDER — OMEPRAZOLE 40 MG/1
40 CAPSULE, DELAYED RELEASE ORAL
Qty: 14 | Refills: 0 | Status: DISCONTINUED | COMMUNITY
Start: 2019-10-14 | End: 2020-11-04

## 2020-11-05 LAB
25(OH)D3 SERPL-MCNC: 13 NG/ML
ALBUMIN SERPL ELPH-MCNC: 4.3 G/DL
ALP BLD-CCNC: 65 U/L
ALT SERPL-CCNC: 11 U/L
ANION GAP SERPL CALC-SCNC: 15 MMOL/L
AST SERPL-CCNC: 16 U/L
BASOPHILS # BLD AUTO: 0.05 K/UL
BASOPHILS NFR BLD AUTO: 0.8 %
BILIRUB SERPL-MCNC: 0.2 MG/DL
BUN SERPL-MCNC: 14 MG/DL
CALCIUM SERPL-MCNC: 9.2 MG/DL
CHLORIDE SERPL-SCNC: 103 MMOL/L
CHOLEST SERPL-MCNC: 227 MG/DL
CO2 SERPL-SCNC: 27 MMOL/L
CREAT SERPL-MCNC: 0.58 MG/DL
CRP SERPL HS-MCNC: 0.93 MG/L
EOSINOPHIL # BLD AUTO: 0.32 K/UL
EOSINOPHIL NFR BLD AUTO: 5 %
ESTIMATED AVERAGE GLUCOSE: 131 MG/DL
GLUCOSE SERPL-MCNC: 38 MG/DL
HBA1C MFR BLD HPLC: 6.2 %
HCT VFR BLD CALC: 44.8 %
HDLC SERPL-MCNC: 42 MG/DL
HGB BLD-MCNC: 13.5 G/DL
IMM GRANULOCYTES NFR BLD AUTO: 0.2 %
LDLC SERPL CALC-MCNC: 145 MG/DL
LDLC SERPL DIRECT ASSAY-MCNC: 150 MG/DL
LYMPHOCYTES # BLD AUTO: 1.62 K/UL
LYMPHOCYTES NFR BLD AUTO: 25.2 %
MAN DIFF?: NORMAL
MCHC RBC-ENTMCNC: 30.1 GM/DL
MCHC RBC-ENTMCNC: 30.1 PG
MCV RBC AUTO: 100 FL
MONOCYTES # BLD AUTO: 0.59 K/UL
MONOCYTES NFR BLD AUTO: 9.2 %
NEUTROPHILS # BLD AUTO: 3.83 K/UL
NEUTROPHILS NFR BLD AUTO: 59.6 %
NONHDLC SERPL-MCNC: 186 MG/DL
PLATELET # BLD AUTO: 296 K/UL
POTASSIUM SERPL-SCNC: 3.7 MMOL/L
PROT SERPL-MCNC: 7.5 G/DL
RBC # BLD: 4.48 M/UL
RBC # FLD: 13.9 %
SODIUM SERPL-SCNC: 144 MMOL/L
T3 SERPL-MCNC: 112 NG/DL
T3FREE SERPL-MCNC: 2.81 PG/ML
T4 FREE SERPL-MCNC: 1 NG/DL
T4 SERPL-MCNC: 6.4 UG/DL
TRIGL SERPL-MCNC: 204 MG/DL
TSH SERPL-ACNC: 3.65 UIU/ML
URATE SERPL-MCNC: 2.9 MG/DL
WBC # FLD AUTO: 6.42 K/UL

## 2020-11-06 LAB — APO LP(A) SERPL-MCNC: 70.7 NMOL/L
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
## 2020-11-08 NOTE — PHYSICAL EXAM
[General Appearance - Well Developed] : well developed [Normal Appearance] : normal appearance [Well Groomed] : well groomed [General Appearance - Well Nourished] : well nourished [No Deformities] : no deformities [General Appearance - In No Acute Distress] : no acute distress [Normal Conjunctiva] : the conjunctiva exhibited no abnormalities [Eyelids - No Xanthelasma] : the eyelids demonstrated no xanthelasmas [Normal Oral Mucosa] : normal oral mucosa [No Oral Pallor] : no oral pallor [No Oral Cyanosis] : no oral cyanosis [Normal Jugular Venous A Waves Present] : normal jugular venous A waves present [Normal Jugular Venous V Waves Present] : normal jugular venous V waves present [No Jugular Venous Pena A Waves] : no jugular venous pena A waves [Abdomen Soft] : soft [Abdomen Tenderness] : non-tender [Abdomen Mass (___ Cm)] : no abdominal mass palpated [Abnormal Walk] : normal gait [Gait - Sufficient For Exercise Testing] : the gait was sufficient for exercise testing [Nail Clubbing] : no clubbing of the fingernails [Cyanosis, Localized] : no localized cyanosis [Petechial Hemorrhages (___cm)] : no petechial hemorrhages [Skin Color & Pigmentation] : normal skin color and pigmentation [] : no rash [No Venous Stasis] : no venous stasis [Skin Lesions] : no skin lesions [No Skin Ulcers] : no skin ulcer [No Xanthoma] : no  xanthoma was observed [Oriented To Time, Place, And Person] : oriented to person, place, and time [Affect] : the affect was normal [Mood] : the mood was normal [No Anxiety] : not feeling anxious [5th Left ICS - MCL] : palpated at the 5th LICS in the midclavicular line [Normal] : normal [No Precordial Heave] : no precordial heave was noted [Normal Rate] : normal [Rhythm Regular] : regular [Normal S1] : normal S1 [Normal S2] : normal S2 [No Gallop] : no gallop heard [No Murmur] : no murmurs heard [2+] : left 2+ [No Abnormalities] : the abdominal aorta was not enlarged and no bruit was heard [No Pitting Edema] : no pitting edema present [Prolonged Exp Time] : with normal expiratory time [Increased E/I Ratio] : with normal expiratory/inspiratory ratio  [FreeTextEntry1] : ankle circumference 7 inches right and 7.25 inches right [Apical Thrill] : no thrill palpable at the apex [Click] : no click [Pericardial Rub] : no pericardial rub [Right Carotid Bruit] : no bruit heard over the right carotid [Left Carotid Bruit] : no bruit heard over the left carotid [Rt] : no varicose veins of the right leg [Lt] : no varicose veins of the left leg

## 2020-11-08 NOTE — ADDENDUM
[FreeTextEntry1] : I spent 60 minutes face to face time with the patient, from 10:00 to 11:00 on 11/4/2020. Thirty minutes were devoted to patient counseling as outlined above in the End of Visit section. Prescription for rosuvastatin was electronically transmitted to patient's pharmacy.

## 2020-11-08 NOTE — DISCUSSION/SUMMARY
[FreeTextEntry1] : \par WEIGHT GOALS:\par \par Category				BMI range - kg/m2	BMI Prime\par Very severely underweight		less than 15		less than 0.60	\par Severely underweight			from 15.0 to 16.0		from 0.60 to 0.64	\par Underweight				from 16.0 to 18.5		from 0.64 to 0.74	\par Normal (healthy weight)			from 18.5 to 25		from 0.74 to 1.0	\par Overweight				from 25 to 30		from 1.0 to 1.2	\par Obese Class I (Moderately obese)		from 30 to 35		from 1.2 to 1.4	\par Obese Class II (Severely obese)		from 35 to 40		from 1.4 to 1.6	\par Obese Class III (Very severely obese)	over 40			over 1.6	\par \par Your current weight is 148 lbs (147 lbs on 11/8/19; 145 lbs on 12/14/18). Given current weight and height 5'6.75", your calculated body mass index (BMI) is 23.4 kg/sqm. Normal BMI is 18.5-25 kg/sqm. Thus current weight is in the normal category. Abdominal waist circumference is measured at the level of the umbilicus and is thus not a pants waist measurement. Your current abdominal waist circumference is 31inches. Normal abdominal waist circumference is < 32 inches for women and < 37 inches for men\par \par SMOKING CESSATION:\par We all know the health risks of smoking, and most of us know that kicking the habit is the single biggest improvement to health a smoker can make. But that doesn’t make it any easier to kick the habit. Whether you’re a teen smoker or a lifetime pack-a-day smoker, quitting can be tough.\par To increase your chances of success, you need to be motivated, have social support, an understanding of what to expect, and a personal game plan. It is possible to learn how to replace your smoking habits, manage your cravings, and join the millions of people who have kicked the habit for good.\par Smoking tobacco is both a psychological habit and a physical addiction. The act of smoking is ingrained as a daily ritual and, at the same time, the nicotine from cigarettes provides a temporary, and addictive, high. Eliminating that regular fix of nicotine will cause your body to experience physical withdrawal symptoms and cravings. To successfully quit smoking, you’ll need to address both the habit and the addiction by changing your behavior and dealing with nicotine withdrawal symptoms.\par Relieving unpleasant and overwhelming feelings without cigarettes\par Managing unpleasant feelings such as stress, depression, loneliness, fear, and anxiety are some of the most common reasons why adults smoke. When you have a bad day, it can seem like your cigarettes are your only friend. Smoking can temporarily make feelings such as sadness, stress, anxiety, depression, and boredom evaporate into thin air. As much comfort as cigarettes provide, though, it’s important to remember that there are healthier (and more effective) ways to keep unpleasant feelings in check. These may include exercising, meditating, using sensory relaxation strategies, and practicing simple breathing exercises. \par For many people, an important aspect of quitting smoking is to find alternate ways to handle these difficult feelings without smoking. Even when cigarettes are no longer a part of your life, the painful and unpleasant feelings that may have prompted you to smoke in the past will still remain. So, it’s worth spending some time thinking about the different ways you intend to deal with stressful situations and the daily irritations that would normally have you reaching for a cigarette.\par Tailoring a personal game plan to your specific needs and desires can be a big help. List the reasons why you want to quit and then keep copies of the list in the places where you’d normally keep your cigarettes, such as in your jacket, purse, or car. Your reasons for quitting smoking might include:\par I will feel healthier and have more energy, whiter teeth and fresher breath.\par I will lower my risk for cancer, heart attacks, strokes, early death, cataracts, and skin wrinkling.\par I will make myself and my partner, friends, and family proud of me.\par I will no longer expose my children and others to the dangers of my second-hand smoke.\par I will have a healthier baby (If you or your partner is pregnant).\par I will have more money to spend.\par I won't have to worry: "When will I get to smoke next?"\par Questions to ask yourself\par To successfully detach from smoking, you will need to identify and address your smoking habits, the true nature of your dependency, and the techniques that work for you. These types of questions can help:\par Do you feel the need to smoke at every meal?\par Are you more of a social smoker?\par Is it a very bad addiction (more than a pack a day)? Or would a simple nicotine patch do the job?\par Is your cigarette smoking linked to other addictions, such as alcohol or gambling?\par Are you open to hypnotherapy and/or acupuncture?\par Are you someone who is open to talking about your addiction with a therapist or counselor?\par Are you interested in getting into a fitness program?\par Take the time to think of what kind of smoker you are, which moments of your life call for a cigarette, and why. This will help you to identify which tips, techniques or therapies may be most beneficial for you. \par Start your stop smoking plan with START\par S = Set a quit date.\par T = Tell family, friends, and co-workers that you plan to quit.\par A = Anticipate and plan for the challenges you'll face while quitting.\par R = Remove cigarettes and other tobacco products from your home, car, and work.\par T = Talk to your doctor about getting help to quit.\par \par After quitting, you may feel dizzy, restless, or even have strong headaches because you’re lacking the immediate release of sugar that comes from nicotine. You may also have a bigger appetite. These sugar-related cravings should only last a few days until your body adjusts so keep your sugar levels a bit higher than usual on those days by drinking plenty of juice (unless you’re a diabetic). It will help prevent the craving symptoms and help your body re-adjust back to normal.\par Tips for managing other cigarette cravings\par Cravings associated with meals\par For some smokers, ending a meal means lighting up, and the prospect of giving that up may appear daunting. TIP: replace that moment after a meal with something such as a piece of fruit, a (healthy) dessert, a square of chocolate, or a stick of gum.\par \par Alcohol and cigarettes\par Many people have a habit of smoking when they have an alcoholic drink. TIP: try non-alcoholic drinks, or try drinking only in bars, restaurant-bars, or friends’ houses where smoking inside is prohibited. Or try snacking on nuts and chips, or chewing on a straw or cocktail stick.\par \par Cravings associated with social smoking\par When friends, family, and co-workers smoke around you, it is doubly difficult to quit or avoid relapse. TIP: Your social circles need to know that you are changing your habits so talk about your decision to quit. Let them know they won’t be able to smoke when you’re in the car with them or taking a coffee break together. \par \par In your workplace, don’t take all your coffee breaks with smokers only, do something else instead, or find non-smokers to have your breaks with.\par Additional tips to deal with cravings and withdrawal symptoms\par Stay active: Keep yourself distracted and occupied, go for walks.\par Keep your hands/fingers busy: Squeeze balls, pencils, or paper clips are good substitutes to satisfy that need for tactile stimulation.\par Keep your mind busy: Read a book or magazine, listen to some music you love.\par Find an oral substitute: Keep other things around to pop in your mouth when you’re craving a cigarette.\par Good choices include mints, hard candy, carrot or celery sticks, gum, and sunflower seeds.\par Drink lots of water: Flushing toxins from your body minimizes withdrawal symptoms and helps cravings pass faster.\par Look for new ways to relax and to cope with depression or anxiety: There are a lot of ways to improve your mood without smoking. \par \par Finding the resources and support to quit smoking\par There are many different methods that have successfully helped people to quit smoking, including:\par Quitting smoking cold turkey.\par Systematically decreasing the number of cigarettes you smoke.\par Reducing your intake of nicotine gradually over time.\par Using nicotine replacement therapy or non-nicotine medications to reduce withdrawal symptoms.\par Utilizing nicotine support groups.\par Trying hypnosis, acupuncture, or counseling using cognitive behavioral techniques.\par You may be successful with the first method you try. More likely, you’ll have to try a number of different methods or a combination of treatments to find the ones that work best for you.\par \par Medication therapy\par Smoking cessation medications can ease withdrawal symptoms and reduce cravings, and are most effective when used as part of a comprehensive stop smoking program monitored by your physician. Talk to your doctor about your options and whether an anti-smoking medication is right for you. [place] Food and Drug Administration (FDA) approved options are: \par \par Nicotine Replacement Therapy\par Nicotine replacement therapy involves "replacing" cigarettes with other nicotine substitutes, such as nicotine gum or a nicotine patch. It works by delivering small and steady doses of nicotine into the body to relieve some of the withdrawal symptoms without the tars and poisonous gases found in cigarettes. This type of treatment helps smokers focus on breaking their psychological addiction and makes it easier to concentrate on learning new behaviors and coping skills.\par \par Non-Nicotine Medication\par These medications help you stop smoking by reducing cravings and withdrawal symptoms without the use of nicotine. Medications such as bupropion (Zyban) and varenicline (Chantix) are intended for short-term use only.\par \par Non-medication therapies\par There are several things you can do to stop smoking that don’t involve nicotine replacement therapy or prescription medications:\par Hypnosis\par A popular option that has produced good results. Forget anything you may have seen from stage hypnotists, hypnosis works by getting you into a deeply relaxed state where you are open to suggestions that strengthen your resolve to quit smoking and increase your negative feelings toward cigarettes. Ask your doctor to recommend a qualified smoking cessation hypnotherapist in your area or refer to the American Society of Clinical Hypnosis (ASCH) for guidelines on selecting a qualified professional.\par Acupuncture\par One of the oldest known medical techniques, acupuncture is believed to work by triggering the release of endorphins (natural pain relievers) that allow the body to relax. As a smoking cessation aid, acupuncture can be helpful in managing smoking withdrawal symptoms. Ask your doctor for a referral or search for a local practitioner at the American Association of Acup[employer] and [employer] (AAAOM).\par Behavioral Therapy\par Nicotine addiction is related to the habitual behaviors (the “rituals”) involved in smoking. Behavior therapy focuses on learning new coping skills and breaking those habits. The American [employer] Association offers a free online smoking cessation program that focuses on behavioral change. To find a local behavioral therapist, check with your doctor or search at the Association for Behavioral and Cognitive Therapies (ABCT).\par Motivational Therapies\par Self-help books and websites can provide a number of ways to motivate yourself to quit smoking. One well known example is calculating the monetary savings. Some people have been able to find the motivation to quit just by calculating how much money they will save after they quit. It may be enough to pay for a summer vacation.\par \par [facility]\par [address]\[place] [address]\par [phone]\par [email or web address]\par \par GLYCEMIC INDEX:\par Foods can be measured by how much they are likely to raise blood sugar. This is called the glycemic index. We want you to eat mostly foods that have a low glycemic index. \par Fruit: choose cherries, grapefruit, prunes, dried apricots, apples, peaches, pears, blueberries, strawberries, oranges, and grapes.  \par Breads and other starches: Choose pumpernickel, rye, sourdough, or stone-ground whole wheat bread. For cereal, choose bran flakes, oat flakes, and oatmeal. For rice, use long-grained white rice. Most pasta is fairly low on the glycemic index; whole wheat or egg pasta is the lowest. Choose new or sweet potatoes and yams.  \par Dairy products: Dairy tends to be fairly low on the glycemic index because of the protein and fat in it. Premium ice cream is lower on the glycemic index than low-fat ice cream.  \par Salty snacks: Hummus, peanuts, walnuts, and cashews are all good choices.  \par Sweets: Most sweets are high on the glycemic index, so make them special treats only. Ones that have protein and fat in them tend to be a bit lower. Milk chocolate or peanut candies are good choices. Pound and sponge cakes are lower on the glycemic index than other types of cakes. For cookies, choose peanut butter, chocolate chip, butter, and oatmeal cookies. For a breakfast treat, choose scones or oatmeal muffins. \par Beans: Choose angely, chickpeas (garbanzo beans), lentils, split peas, lima beans, and kidney beans. \par Meat and vegetables are low on the glycemic index. Soups and stews made with meat or vegetables are also good.\par

## 2021-05-05 ENCOUNTER — NON-APPOINTMENT (OUTPATIENT)
Age: 66
End: 2021-05-05

## 2021-05-05 ENCOUNTER — APPOINTMENT (OUTPATIENT)
Dept: CARDIOLOGY | Facility: CLINIC | Age: 66
End: 2021-05-05
Payer: MEDICARE

## 2021-05-05 VITALS
SYSTOLIC BLOOD PRESSURE: 107 MMHG | HEIGHT: 67 IN | BODY MASS INDEX: 23.7 KG/M2 | TEMPERATURE: 97.2 F | HEART RATE: 67 BPM | DIASTOLIC BLOOD PRESSURE: 59 MMHG | OXYGEN SATURATION: 99 % | WEIGHT: 151 LBS | RESPIRATION RATE: 12 BRPM

## 2021-05-05 VITALS — DIASTOLIC BLOOD PRESSURE: 56 MMHG | OXYGEN SATURATION: 97 % | SYSTOLIC BLOOD PRESSURE: 110 MMHG | HEART RATE: 74 BPM

## 2021-05-05 VITALS — HEART RATE: 74 BPM | DIASTOLIC BLOOD PRESSURE: 56 MMHG | OXYGEN SATURATION: 97 % | SYSTOLIC BLOOD PRESSURE: 109 MMHG

## 2021-05-05 VITALS — SYSTOLIC BLOOD PRESSURE: 116 MMHG | HEART RATE: 68 BPM | DIASTOLIC BLOOD PRESSURE: 65 MMHG | OXYGEN SATURATION: 98 %

## 2021-05-05 DIAGNOSIS — R03.0 ELEVATED BLOOD-PRESSURE READING, W/OUT DIAGNOSIS OF HYPERTENSION: ICD-10-CM

## 2021-05-05 PROCEDURE — 99407 BEHAV CHNG SMOKING > 10 MIN: CPT

## 2021-05-05 PROCEDURE — 93040 RHYTHM ECG WITH REPORT: CPT | Mod: 59

## 2021-05-05 PROCEDURE — 99215 OFFICE O/P EST HI 40 MIN: CPT

## 2021-05-05 PROCEDURE — 36415 COLL VENOUS BLD VENIPUNCTURE: CPT

## 2021-05-05 PROCEDURE — 93000 ELECTROCARDIOGRAM COMPLETE: CPT

## 2021-05-06 LAB
25(OH)D3 SERPL-MCNC: 22.6 NG/ML
ALBUMIN SERPL ELPH-MCNC: 4.7 G/DL
ALP BLD-CCNC: 67 U/L
ALT SERPL-CCNC: 14 U/L
ANION GAP SERPL CALC-SCNC: 15 MMOL/L
APPEARANCE: ABNORMAL
AST SERPL-CCNC: 19 U/L
BACTERIA: NEGATIVE
BASOPHILS # BLD AUTO: 0.04 K/UL
BASOPHILS NFR BLD AUTO: 0.6 %
BILIRUB SERPL-MCNC: 0.3 MG/DL
BILIRUBIN URINE: NEGATIVE
BLOOD URINE: NEGATIVE
BUN SERPL-MCNC: 14 MG/DL
CALCIUM SERPL-MCNC: 9.6 MG/DL
CHLORIDE SERPL-SCNC: 102 MMOL/L
CHOLEST SERPL-MCNC: 249 MG/DL
CO2 SERPL-SCNC: 23 MMOL/L
COLOR: YELLOW
CREAT SERPL-MCNC: 0.63 MG/DL
CREAT SPEC-SCNC: 319 MG/DL
CRP SERPL HS-MCNC: 0.98 MG/L
EOSINOPHIL # BLD AUTO: 0.2 K/UL
EOSINOPHIL NFR BLD AUTO: 3.2 %
ESTIMATED AVERAGE GLUCOSE: 120 MG/DL
GLUCOSE QUALITATIVE U: NEGATIVE
GLUCOSE SERPL-MCNC: 70 MG/DL
HBA1C MFR BLD HPLC: 5.8 %
HCT VFR BLD CALC: 40.4 %
HDLC SERPL-MCNC: 43 MG/DL
HGB BLD-MCNC: 13.3 G/DL
HYALINE CASTS: 0 /LPF
IMM GRANULOCYTES NFR BLD AUTO: 0.3 %
KETONES URINE: NEGATIVE
LDLC SERPL CALC-MCNC: 169 MG/DL
LDLC SERPL DIRECT ASSAY-MCNC: 172 MG/DL
LEUKOCYTE ESTERASE URINE: NEGATIVE
LYMPHOCYTES # BLD AUTO: 1.61 K/UL
LYMPHOCYTES NFR BLD AUTO: 25.6 %
MAN DIFF?: NORMAL
MCHC RBC-ENTMCNC: 30.5 PG
MCHC RBC-ENTMCNC: 32.9 GM/DL
MCV RBC AUTO: 92.7 FL
MICROALBUMIN 24H UR DL<=1MG/L-MCNC: 4 MG/DL
MICROALBUMIN/CREAT 24H UR-RTO: 13 MG/G
MICROSCOPIC-UA: NORMAL
MONOCYTES # BLD AUTO: 0.56 K/UL
MONOCYTES NFR BLD AUTO: 8.9 %
NEUTROPHILS # BLD AUTO: 3.85 K/UL
NEUTROPHILS NFR BLD AUTO: 61.4 %
NITRITE URINE: NEGATIVE
NONHDLC SERPL-MCNC: 206 MG/DL
PH URINE: 5.5
PLATELET # BLD AUTO: 270 K/UL
POTASSIUM SERPL-SCNC: 4.4 MMOL/L
PROT SERPL-MCNC: 7.9 G/DL
PROTEIN URINE: ABNORMAL
RBC # BLD: 4.36 M/UL
RBC # FLD: 13.3 %
RED BLOOD CELLS URINE: 1 /HPF
SODIUM SERPL-SCNC: 141 MMOL/L
SPECIFIC GRAVITY URINE: 1.03
SQUAMOUS EPITHELIAL CELLS: 4 /HPF
T4 FREE SERPL-MCNC: 1 NG/DL
TRIGL SERPL-MCNC: 182 MG/DL
TSH SERPL-ACNC: 7.73 UIU/ML
URINE COMMENTS: NORMAL
UROBILINOGEN URINE: NORMAL
WBC # FLD AUTO: 6.28 K/UL
WHITE BLOOD CELLS URINE: 2 /HPF

## 2021-05-06 RX ORDER — DULOXETINE HYDROCHLORIDE 30 MG/1
30 CAPSULE, DELAYED RELEASE PELLETS ORAL
Qty: 60 | Refills: 0 | Status: COMPLETED | COMMUNITY
Start: 2021-01-27

## 2021-05-06 RX ORDER — AMOXICILLIN AND CLAVULANATE POTASSIUM 875; 125 MG/1; MG/1
875-125 TABLET, COATED ORAL
Qty: 10 | Refills: 0 | Status: COMPLETED | COMMUNITY
Start: 2020-12-14

## 2021-05-06 RX ORDER — PREDNISONE 20 MG/1
20 TABLET ORAL
Qty: 10 | Refills: 0 | Status: COMPLETED | COMMUNITY
Start: 2020-12-14

## 2021-05-06 NOTE — ADDENDUM
[FreeTextEntry1] : I spent 60 minutes face to face time with the patient, from 9:00 to 10:00 on 5/5/21. Thirty minutes were devoted to patient counseling as outlined above in the End of Visit section. Approximately 15 min were devoted to smoking cessation counseling. I called the patient at 20:00 on 5/6/21 and left message for her to call back to review labs and revise plans accordingly.

## 2021-05-06 NOTE — PHYSICAL EXAM
[General Appearance - Well Developed] : well developed [Normal Appearance] : normal appearance [Well Groomed] : well groomed [General Appearance - Well Nourished] : well nourished [No Deformities] : no deformities [General Appearance - In No Acute Distress] : no acute distress [Normal Conjunctiva] : the conjunctiva exhibited no abnormalities [Eyelids - No Xanthelasma] : the eyelids demonstrated no xanthelasmas [Normal Oral Mucosa] : normal oral mucosa [No Oral Pallor] : no oral pallor [No Oral Cyanosis] : no oral cyanosis [Normal Jugular Venous A Waves Present] : normal jugular venous A waves present [Normal Jugular Venous V Waves Present] : normal jugular venous V waves present [No Jugular Venous Pena A Waves] : no jugular venous pena A waves [Abdomen Soft] : soft [Abdomen Tenderness] : non-tender [Abdomen Mass (___ Cm)] : no abdominal mass palpated [Gait - Sufficient For Exercise Testing] : the gait was sufficient for exercise testing [Abnormal Walk] : normal gait [Nail Clubbing] : no clubbing of the fingernails [Petechial Hemorrhages (___cm)] : no petechial hemorrhages [Cyanosis, Localized] : no localized cyanosis [Skin Color & Pigmentation] : normal skin color and pigmentation [] : no rash [No Venous Stasis] : no venous stasis [Skin Lesions] : no skin lesions [No Skin Ulcers] : no skin ulcer [No Xanthoma] : no  xanthoma was observed [Oriented To Time, Place, And Person] : oriented to person, place, and time [Affect] : the affect was normal [Mood] : the mood was normal [No Anxiety] : not feeling anxious [5th Left ICS - MCL] : palpated at the 5th LICS in the midclavicular line [Normal] : normal [No Precordial Heave] : no precordial heave was noted [Normal Rate] : normal [Rhythm Regular] : regular [Normal S1] : normal S1 [Normal S2] : normal S2 [No Gallop] : no gallop heard [No Murmur] : no murmurs heard [2+] : left 2+ [No Abnormalities] : the abdominal aorta was not enlarged and no bruit was heard [No Pitting Edema] : no pitting edema present [Prolonged Exp Time] : with normal expiratory time [Increased E/I Ratio] : with normal expiratory/inspiratory ratio  [FreeTextEntry1] : ankle circumference 7.0 in right and 7.25 inches left (previously 7 in and 7.25 in respectively) [Apical Thrill] : no thrill palpable at the apex [Click] : no click [Pericardial Rub] : no pericardial rub [Right Carotid Bruit] : no bruit heard over the right carotid [Left Carotid Bruit] : no bruit heard over the left carotid [Rt] : no varicose veins of the right leg [Lt] : no varicose veins of the left leg

## 2021-05-06 NOTE — DISCUSSION/SUMMARY
[FreeTextEntry1] : \par WEIGHT GOALS:\par \par Category				BMI range - kg/m2	BMI Prime\par Very severely underweight		less than 15		less than 0.60	\par Severely underweight			from 15.0 to 16.0		from 0.60 to 0.64	\par Underweight				from 16.0 to 18.5		from 0.64 to 0.74	\par Normal (healthy weight)			from 18.5 to 25		from 0.74 to 1.0	\par Overweight				from 25 to 30		from 1.0 to 1.2	\par Obese Class I (Moderately obese)		from 30 to 35		from 1.2 to 1.4	\par Obese Class II (Severely obese)		from 35 to 40		from 1.4 to 1.6	\par Obese Class III (Very severely obese)	over 40			over 1.6	\par \par Your current weight is 151 lbs (147 lbs on 11/8/19; 145 lbs on 12/14/18). Given current weight and height 5'6.75", your calculated body mass index (BMI) is 23.7 kg/sqm. Normal BMI is 18.5-25 kg/sqm. Thus current weight is in the normal category. Abdominal waist circumference is measured at the level of the umbilicus and is thus not a pants waist measurement. Your current abdominal waist circumference is 35 inches. Normal abdominal waist circumference is < 32 inches for women and < 37 inches for men\par \par SMOKING CESSATION:\par We all know the health risks of smoking, and most of us know that kicking the habit is the single biggest improvement to health a smoker can make. But that doesn’t make it any easier to kick the habit. Whether you’re a teen smoker or a lifetime pack-a-day smoker, quitting can be tough.\par To increase your chances of success, you need to be motivated, have social support, an understanding of what to expect, and a personal game plan. It is possible to learn how to replace your smoking habits, manage your cravings, and join the millions of people who have kicked the habit for good.\par Smoking tobacco is both a psychological habit and a physical addiction. The act of smoking is ingrained as a daily ritual and, at the same time, the nicotine from cigarettes provides a temporary, and addictive, high. Eliminating that regular fix of nicotine will cause your body to experience physical withdrawal symptoms and cravings. To successfully quit smoking, you’ll need to address both the habit and the addiction by changing your behavior and dealing with nicotine withdrawal symptoms.\par Relieving unpleasant and overwhelming feelings without cigarettes\par Managing unpleasant feelings such as stress, depression, loneliness, fear, and anxiety are some of the most common reasons why adults smoke. When you have a bad day, it can seem like your cigarettes are your only friend. Smoking can temporarily make feelings such as sadness, stress, anxiety, depression, and boredom evaporate into thin air. As much comfort as cigarettes provide, though, it’s important to remember that there are healthier (and more effective) ways to keep unpleasant feelings in check. These may include exercising, meditating, using sensory relaxation strategies, and practicing simple breathing exercises. \par For many people, an important aspect of quitting smoking is to find alternate ways to handle these difficult feelings without smoking. Even when cigarettes are no longer a part of your life, the painful and unpleasant feelings that may have prompted you to smoke in the past will still remain. So, it’s worth spending some time thinking about the different ways you intend to deal with stressful situations and the daily irritations that would normally have you reaching for a cigarette.\par Tailoring a personal game plan to your specific needs and desires can be a big help. List the reasons why you want to quit and then keep copies of the list in the places where you’d normally keep your cigarettes, such as in your jacket, purse, or car. Your reasons for quitting smoking might include:\par I will feel healthier and have more energy, whiter teeth and fresher breath.\par I will lower my risk for cancer, heart attacks, strokes, early death, cataracts, and skin wrinkling.\par I will make myself and my partner, friends, and family proud of me.\par I will no longer expose my children and others to the dangers of my second-hand smoke.\par I will have a healthier baby (If you or your partner is pregnant).\par I will have more money to spend.\par I won't have to worry: "When will I get to smoke next?"\par Questions to ask yourself\par To successfully detach from smoking, you will need to identify and address your smoking habits, the true nature of your dependency, and the techniques that work for you. These types of questions can help:\par Do you feel the need to smoke at every meal?\par Are you more of a social smoker?\par Is it a very bad addiction (more than a pack a day)? Or would a simple nicotine patch do the job?\par Is your cigarette smoking linked to other addictions, such as alcohol or gambling?\par Are you open to hypnotherapy and/or acupuncture?\par Are you someone who is open to talking about your addiction with a therapist or counselor?\par Are you interested in getting into a fitness program?\par Take the time to think of what kind of smoker you are, which moments of your life call for a cigarette, and why. This will help you to identify which tips, techniques or therapies may be most beneficial for you. \par Start your stop smoking plan with START\par S = Set a quit date.\par T = Tell family, friends, and co-workers that you plan to quit.\par A = Anticipate and plan for the challenges you'll face while quitting.\par R = Remove cigarettes and other tobacco products from your home, car, and work.\par T = Talk to your doctor about getting help to quit.\par \par After quitting, you may feel dizzy, restless, or even have strong headaches because you’re lacking the immediate release of sugar that comes from nicotine. You may also have a bigger appetite. These sugar-related cravings should only last a few days until your body adjusts so keep your sugar levels a bit higher than usual on those days by drinking plenty of juice (unless you’re a diabetic). It will help prevent the craving symptoms and help your body re-adjust back to normal.\par Tips for managing other cigarette cravings\par Cravings associated with meals\par For some smokers, ending a meal means lighting up, and the prospect of giving that up may appear daunting. TIP: replace that moment after a meal with something such as a piece of fruit, a (healthy) dessert, a square of chocolate, or a stick of gum.\par \par Alcohol and cigarettes\par Many people have a habit of smoking when they have an alcoholic drink. TIP: try non-alcoholic drinks, or try drinking only in bars, restaurant-bars, or friends’ houses where smoking inside is prohibited. Or try snacking on nuts and chips, or chewing on a straw or cocktail stick.\par \par Cravings associated with social smoking\par When friends, family, and co-workers smoke around you, it is doubly difficult to quit or avoid relapse. TIP: Your social circles need to know that you are changing your habits so talk about your decision to quit. Let them know they won’t be able to smoke when you’re in the car with them or taking a coffee break together. \par \par In your workplace, don’t take all your coffee breaks with smokers only, do something else instead, or find non-smokers to have your breaks with.\par Additional tips to deal with cravings and withdrawal symptoms\par Stay active: Keep yourself distracted and occupied, go for walks.\par Keep your hands/fingers busy: Squeeze balls, pencils, or paper clips are good substitutes to satisfy that need for tactile stimulation.\par Keep your mind busy: Read a book or magazine, listen to some music you love.\par Find an oral substitute: Keep other things around to pop in your mouth when you’re craving a cigarette.\par Good choices include mints, hard candy, carrot or celery sticks, gum, and sunflower seeds.\par Drink lots of water: Flushing toxins from your body minimizes withdrawal symptoms and helps cravings pass faster.\par Look for new ways to relax and to cope with depression or anxiety: There are a lot of ways to improve your mood without smoking. \par \par Finding the resources and support to quit smoking\par There are many different methods that have successfully helped people to quit smoking, including:\par Quitting smoking cold turkey.\par Systematically decreasing the number of cigarettes you smoke.\par Reducing your intake of nicotine gradually over time.\par Using nicotine replacement therapy or non-nicotine medications to reduce withdrawal symptoms.\par Utilizing nicotine support groups.\par Trying hypnosis, acupuncture, or counseling using cognitive behavioral techniques.\par You may be successful with the first method you try. More likely, you’ll have to try a number of different methods or a combination of treatments to find the ones that work best for you.\par \par Medication therapy\par Smoking cessation medications can ease withdrawal symptoms and reduce cravings, and are most effective when used as part of a comprehensive stop smoking program monitored by your physician. Talk to your doctor about your options and whether an anti-smoking medication is right for you. [place] Food and Drug Administration (FDA) approved options are: \par \par Nicotine Replacement Therapy\par Nicotine replacement therapy involves "replacing" cigarettes with other nicotine substitutes, such as nicotine gum or a nicotine patch. It works by delivering small and steady doses of nicotine into the body to relieve some of the withdrawal symptoms without the tars and poisonous gases found in cigarettes. This type of treatment helps smokers focus on breaking their psychological addiction and makes it easier to concentrate on learning new behaviors and coping skills.\par \par Non-Nicotine Medication\par These medications help you stop smoking by reducing cravings and withdrawal symptoms without the use of nicotine. Medications such as bupropion (Zyban) and varenicline (Chantix) are intended for short-term use only.\par \par Non-medication therapies\par There are several things you can do to stop smoking that don’t involve nicotine replacement therapy or prescription medications:\par Hypnosis\par A popular option that has produced good results. Forget anything you may have seen from stage hypnotists, hypnosis works by getting you into a deeply relaxed state where you are open to suggestions that strengthen your resolve to quit smoking and increase your negative feelings toward cigarettes. Ask your doctor to recommend a qualified smoking cessation hypnotherapist in your area or refer to the American Society of Clinical Hypnosis (ASCH) for guidelines on selecting a qualified professional.\par Acupuncture\par One of the oldest known medical techniques, acupuncture is believed to work by triggering the release of endorphins (natural pain relievers) that allow the body to relax. As a smoking cessation aid, acupuncture can be helpful in managing smoking withdrawal symptoms. Ask your doctor for a referral or search for a local practitioner at the American Association of Acup[employer] and [place] [facility] (AAAOM).\par Behavioral Therapy\par Nicotine addiction is related to the habitual behaviors (the “rituals”) involved in smoking. Behavior therapy focuses on learning new coping skills and breaking those habits. The American Lung Association offers a free online smoking cessation program that focuses on behavioral change. To find a local behavioral therapist, check with your doctor or search at the Association for Behavioral and Cognitive Therapies (ABCT).\par Motivational Therapies\par Self-help books and websites can provide a number of ways to motivate yourself to quit smoking. One well known example is calculating the monetary savings. Some people have been able to find the motivation to quit just by calculating how much money they will save after they quit. It may be enough to pay for a summer vacation.\par \par [facility]\par [address]\[place] [address]\par [phone]\par [email or web address]\par \par GLYCEMIC INDEX:\par Foods can be measured by how much they are likely to raise blood sugar. This is called the glycemic index. We want you to eat mostly foods that have a low glycemic index. \par Fruit: choose cherries, grapefruit, prunes, dried apricots, apples, peaches, pears, blueberries, strawberries, oranges, and grapes.  \par Breads and other starches: Choose pumpernickel, rye, sourdough, or stone-ground whole wheat bread. For cereal, choose bran flakes, oat flakes, and oatmeal. For rice, use long-grained white rice. Most pasta is fairly low on the glycemic index; whole wheat or egg pasta is the lowest. Choose new or sweet potatoes and yams.  \par Dairy products: Dairy tends to be fairly low on the glycemic index because of the protein and fat in it. Premium ice cream is lower on the glycemic index than low-fat ice cream.  \par Salty snacks: Hummus, peanuts, walnuts, and cashews are all good choices.  \par Sweets: Most sweets are high on the glycemic index, so make them special treats only. Ones that have protein and fat in them tend to be a bit lower. Milk chocolate or peanut candies are good choices. Pound and sponge cakes are lower on the glycemic index than other types of cakes. For cookies, choose peanut butter, chocolate chip, butter, and oatmeal cookies. For a breakfast treat, choose scones or oatmeal muffins. \par Beans: Choose angely, chickpeas (garbanzo beans), lentils, split peas, lima beans, and kidney beans. \par Meat and vegetables are low on the glycemic index. Soups and stews made with meat or vegetables are also good.\par
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
## 2021-05-06 NOTE — COUNSELING
[Risk of tobacco use and health benefits of smoking cessation discussed] : Risk of tobacco use and health benefits of smoking cessation discussed [Cessation strategies including cessation program discussed] : Cessation strategies including cessation program discussed [Use of nicotine replacement therapies and other medications discussed] : Use of nicotine replacement therapies and other medications discussed [Encouraged to pick a quit date and identify support needed to quit] : Encouraged to pick a quit date and identify support needed to quit [Willing to Quit Smoking] : Willing to quit smoking [Participate in Class] : Participate in class [Tobacco Use Cessation Intensive Greater Than 10 Minutes] : Tobacco Use Cessation Intensive Greater Than 10 Minutes [FreeTextEntry3] : 15

## 2021-05-07 LAB — APO LP(A) SERPL-MCNC: 67.8 NMOL/L

## 2021-05-25 ENCOUNTER — APPOINTMENT (OUTPATIENT)
Dept: CARDIOLOGY | Facility: CLINIC | Age: 66
End: 2021-05-25
Payer: MEDICARE

## 2021-05-25 VITALS
HEIGHT: 67 IN | DIASTOLIC BLOOD PRESSURE: 58 MMHG | BODY MASS INDEX: 23.7 KG/M2 | SYSTOLIC BLOOD PRESSURE: 108 MMHG | WEIGHT: 151 LBS | HEART RATE: 76 BPM

## 2021-05-25 DIAGNOSIS — R06.00 DYSPNEA, UNSPECIFIED: ICD-10-CM

## 2021-05-25 DIAGNOSIS — E55.9 VITAMIN D DEFICIENCY, UNSPECIFIED: ICD-10-CM

## 2021-05-25 PROCEDURE — 99407 BEHAV CHNG SMOKING > 10 MIN: CPT | Mod: 95

## 2021-05-25 PROCEDURE — 99215 OFFICE O/P EST HI 40 MIN: CPT | Mod: 95

## 2021-05-25 NOTE — PHYSICAL EXAM
[General Appearance - Well Developed] : well developed [Normal Appearance] : normal appearance [Well Groomed] : well groomed [General Appearance - Well Nourished] : well nourished [No Deformities] : no deformities [General Appearance - In No Acute Distress] : no acute distress [Normal Conjunctiva] : the conjunctiva exhibited no abnormalities [Eyelids - No Xanthelasma] : the eyelids demonstrated no xanthelasmas [Normal Oral Mucosa] : normal oral mucosa [No Oral Pallor] : no oral pallor [No Oral Cyanosis] : no oral cyanosis [Normal Jugular Venous A Waves Present] : normal jugular venous A waves present [Normal Jugular Venous V Waves Present] : normal jugular venous V waves present [No Jugular Venous Pena A Waves] : no jugular venous pena A waves [Abdomen Soft] : soft [Abdomen Tenderness] : non-tender [Abdomen Mass (___ Cm)] : no abdominal mass palpated [Gait - Sufficient For Exercise Testing] : the gait was sufficient for exercise testing [Abnormal Walk] : normal gait [Nail Clubbing] : no clubbing of the fingernails [Cyanosis, Localized] : no localized cyanosis [Petechial Hemorrhages (___cm)] : no petechial hemorrhages [Skin Color & Pigmentation] : normal skin color and pigmentation [] : no rash [No Venous Stasis] : no venous stasis [Skin Lesions] : no skin lesions [No Skin Ulcers] : no skin ulcer [No Xanthoma] : no  xanthoma was observed [Oriented To Time, Place, And Person] : oriented to person, place, and time [Affect] : the affect was normal [Mood] : the mood was normal [No Anxiety] : not feeling anxious [5th Left ICS - MCL] : palpated at the 5th LICS in the midclavicular line [Normal] : normal [No Precordial Heave] : no precordial heave was noted [Normal Rate] : normal [Rhythm Regular] : regular [Normal S1] : normal S1 [Normal S2] : normal S2 [No Gallop] : no gallop heard [No Murmur] : no murmurs heard [2+] : left 2+ [No Abnormalities] : the abdominal aorta was not enlarged and no bruit was heard [No Pitting Edema] : no pitting edema present [Prolonged Exp Time] : with normal expiratory time [Increased E/I Ratio] : with normal expiratory/inspiratory ratio  [FreeTextEntry1] : ankle circumference 7.0 in right and 7.25 inches left (previously 7 in and 7.25 in respectively) [Apical Thrill] : no thrill palpable at the apex [Click] : no click [Pericardial Rub] : no pericardial rub [Right Carotid Bruit] : no bruit heard over the right carotid [Left Carotid Bruit] : no bruit heard over the left carotid [Rt] : no varicose veins of the right leg [Lt] : no varicose veins of the left leg

## 2021-05-25 NOTE — REASON FOR VISIT
[Follow-Up - Clinic] : a clinic follow-up of [Abnormal Test Result] : an abnormal test result [Chest Pain] : chest pain [CV Risk Factors and Non-Cardiac Disease] : CV risk factors and non-cardiac disease [Hyperlipidemia] : hyperlipidemia

## 2021-05-25 NOTE — DISCUSSION/SUMMARY
[FreeTextEntry1] : \par WEIGHT GOALS:\par \par Category				BMI range - kg/m2	BMI Prime\par Very severely underweight		less than 15		less than 0.60	\par Severely underweight			from 15.0 to 16.0		from 0.60 to 0.64	\par Underweight				from 16.0 to 18.5		from 0.64 to 0.74	\par Normal (healthy weight)			from 18.5 to 25		from 0.74 to 1.0	\par Overweight				from 25 to 30		from 1.0 to 1.2	\par Obese Class I (Moderately obese)		from 30 to 35		from 1.2 to 1.4	\par Obese Class II (Severely obese)		from 35 to 40		from 1.4 to 1.6	\par Obese Class III (Very severely obese)	over 40			over 1.6	\par \par Your current weight is 151 lbs (147 lbs on 11/8/19; 145 lbs on 12/14/18). Given current weight and height 5'6.75", your calculated body mass index (BMI) is 23.7 kg/sqm. Normal BMI is 18.5-25 kg/sqm. Thus current weight is in the normal category. Abdominal waist circumference is measured at the level of the umbilicus and is thus not a pants waist measurement. Your current abdominal waist circumference is 35 inches. Normal abdominal waist circumference is < 32 inches for women and < 37 inches for men\par \par SMOKING CESSATION:\par We all know the health risks of smoking, and most of us know that kicking the habit is the single biggest improvement to health a smoker can make. But that doesn’t make it any easier to kick the habit. Whether you’re a teen smoker or a lifetime pack-a-day smoker, quitting can be tough.\par To increase your chances of success, you need to be motivated, have social support, an understanding of what to expect, and a personal game plan. It is possible to learn how to replace your smoking habits, manage your cravings, and join the millions of people who have kicked the habit for good.\par Smoking tobacco is both a psychological habit and a physical addiction. The act of smoking is ingrained as a daily ritual and, at the same time, the nicotine from cigarettes provides a temporary, and addictive, high. Eliminating that regular fix of nicotine will cause your body to experience physical withdrawal symptoms and cravings. To successfully quit smoking, you’ll need to address both the habit and the addiction by changing your behavior and dealing with nicotine withdrawal symptoms.\par Relieving unpleasant and overwhelming feelings without cigarettes\par Managing unpleasant feelings such as stress, depression, loneliness, fear, and anxiety are some of the most common reasons why adults smoke. When you have a bad day, it can seem like your cigarettes are your only friend. Smoking can temporarily make feelings such as sadness, stress, anxiety, depression, and boredom evaporate into thin air. As much comfort as cigarettes provide, though, it’s important to remember that there are healthier (and more effective) ways to keep unpleasant feelings in check. These may include exercising, meditating, using sensory relaxation strategies, and practicing simple breathing exercises. \par For many people, an important aspect of quitting smoking is to find alternate ways to handle these difficult feelings without smoking. Even when cigarettes are no longer a part of your life, the painful and unpleasant feelings that may have prompted you to smoke in the past will still remain. So, it’s worth spending some time thinking about the different ways you intend to deal with stressful situations and the daily irritations that would normally have you reaching for a cigarette.\par Tailoring a personal game plan to your specific needs and desires can be a big help. List the reasons why you want to quit and then keep copies of the list in the places where you’d normally keep your cigarettes, such as in your jacket, purse, or car. Your reasons for quitting smoking might include:\par I will feel healthier and have more energy, whiter teeth and fresher breath.\par I will lower my risk for cancer, heart attacks, strokes, early death, cataracts, and skin wrinkling.\par I will make myself and my partner, friends, and family proud of me.\par I will no longer expose my children and others to the dangers of my second-hand smoke.\par I will have a healthier baby (If you or your partner is pregnant).\par I will have more money to spend.\par I won't have to worry: "When will I get to smoke next?"\par Questions to ask yourself\par To successfully detach from smoking, you will need to identify and address your smoking habits, the true nature of your dependency, and the techniques that work for you. These types of questions can help:\par Do you feel the need to smoke at every meal?\par Are you more of a social smoker?\par Is it a very bad addiction (more than a pack a day)? Or would a simple nicotine patch do the job?\par Is your cigarette smoking linked to other addictions, such as alcohol or gambling?\par Are you open to hypnotherapy and/or acupuncture?\par Are you someone who is open to talking about your addiction with a therapist or counselor?\par Are you interested in getting into a fitness program?\par Take the time to think of what kind of smoker you are, which moments of your life call for a cigarette, and why. This will help you to identify which tips, techniques or therapies may be most beneficial for you. \par Start your stop smoking plan with START\par S = Set a quit date.\par T = Tell family, friends, and co-workers that you plan to quit.\par A = Anticipate and plan for the challenges you'll face while quitting.\par R = Remove cigarettes and other tobacco products from your home, car, and work.\par T = Talk to your doctor about getting help to quit.\par \par After quitting, you may feel dizzy, restless, or even have strong headaches because you’re lacking the immediate release of sugar that comes from nicotine. You may also have a bigger appetite. These sugar-related cravings should only last a few days until your body adjusts so keep your sugar levels a bit higher than usual on those days by drinking plenty of juice (unless you’re a diabetic). It will help prevent the craving symptoms and help your body re-adjust back to normal.\par Tips for managing other cigarette cravings\par Cravings associated with meals\par For some smokers, ending a meal means lighting up, and the prospect of giving that up may appear daunting. TIP: replace that moment after a meal with something such as a piece of fruit, a (healthy) dessert, a square of chocolate, or a stick of gum.\par \par Alcohol and cigarettes\par Many people have a habit of smoking when they have an alcoholic drink. TIP: try non-alcoholic drinks, or try drinking only in bars, restaurant-bars, or friends’ houses where smoking inside is prohibited. Or try snacking on nuts and chips, or chewing on a straw or cocktail stick.\par \par Cravings associated with social smoking\par When friends, family, and co-workers smoke around you, it is doubly difficult to quit or avoid relapse. TIP: Your social circles need to know that you are changing your habits so talk about your decision to quit. Let them know they won’t be able to smoke when you’re in the car with them or taking a coffee break together. \par \par In your workplace, don’t take all your coffee breaks with smokers only, do something else instead, or find non-smokers to have your breaks with.\par Additional tips to deal with cravings and withdrawal symptoms\par Stay active: Keep yourself distracted and occupied, go for walks.\par Keep your hands/fingers busy: Squeeze balls, pencils, or paper clips are good substitutes to satisfy that need for tactile stimulation.\par Keep your mind busy: Read a book or magazine, listen to some music you love.\par Find an oral substitute: Keep other things around to pop in your mouth when you’re craving a cigarette.\par Good choices include mints, hard candy, carrot or celery sticks, gum, and sunflower seeds.\par Drink lots of water: Flushing toxins from your body minimizes withdrawal symptoms and helps cravings pass faster.\par Look for new ways to relax and to cope with depression or anxiety: There are a lot of ways to improve your mood without smoking. \par \par Finding the resources and support to quit smoking\par There are many different methods that have successfully helped people to quit smoking, including:\par Quitting smoking cold turkey.\par Systematically decreasing the number of cigarettes you smoke.\par Reducing your intake of nicotine gradually over time.\par Using nicotine replacement therapy or non-nicotine medications to reduce withdrawal symptoms.\par Utilizing nicotine support groups.\par Trying hypnosis, acupuncture, or counseling using cognitive behavioral techniques.\par You may be successful with the first method you try. More likely, you’ll have to try a number of different methods or a combination of treatments to find the ones that work best for you.\par \par Medication therapy\par Smoking cessation medications can ease withdrawal symptoms and reduce cravings, and are most effective when used as part of a comprehensive stop smoking program monitored by your physician. Talk to your doctor about your options and whether an anti-smoking medication is right for you. [place] Food and Drug Administration (FDA) approved options are: \par \par Nicotine Replacement Therapy\par Nicotine replacement therapy involves "replacing" cigarettes with other nicotine substitutes, such as nicotine gum or a nicotine patch. It works by delivering small and steady doses of nicotine into the body to relieve some of the withdrawal symptoms without the tars and poisonous gases found in cigarettes. This type of treatment helps smokers focus on breaking their psychological addiction and makes it easier to concentrate on learning new behaviors and coping skills.\par \par Non-Nicotine Medication\par These medications help you stop smoking by reducing cravings and withdrawal symptoms without the use of nicotine. Medications such as bupropion (Zyban) and varenicline (Chantix) are intended for short-term use only.\par \par Non-medication therapies\par There are several things you can do to stop smoking that don’t involve nicotine replacement therapy or prescription medications:\par Hypnosis\par A popular option that has produced good results. Forget anything you may have seen from stage hypnotists, hypnosis works by getting you into a deeply relaxed state where you are open to suggestions that strengthen your resolve to quit smoking and increase your negative feelings toward cigarettes. Ask your doctor to recommend a qualified smoking cessation hypnotherapist in your area or refer to the American Society of Clinical Hypnosis (ASCH) for guidelines on selecting a qualified professional.\par Acupuncture\par One of the oldest known medical techniques, acupuncture is believed to work by triggering the release of endorphins (natural pain relievers) that allow the body to relax. As a smoking cessation aid, acupuncture can be helpful in managing smoking withdrawal symptoms. Ask your doctor for a referral or search for a local practitioner at the American Association of Acup[employer] and [employer] (AAAOM).\par Behavioral Therapy\par Nicotine addiction is related to the habitual behaviors (the “rituals”) involved in smoking. Behavior therapy focuses on learning new coping skills and breaking those habits. The American Lung Association offers a free online smoking cessation program that focuses on behavioral change. To find a local behavioral therapist, check with your doctor or search at the Association for Behavioral and Cognitive Therapies (ABCT).\par Motivational Therapies\par Self-help books and websites can provide a number of ways to motivate yourself to quit smoking. One well known example is calculating the monetary savings. Some people have been able to find the motivation to quit just by calculating how much money they will save after they quit. It may be enough to pay for a summer vacation.\par \par [facility]\par [address]\[place] [address]\par [phone]\par [email or web address]\par **Referral made on 5/25/21.\par \par GLYCEMIC INDEX:\par Foods can be measured by how much they are likely to raise blood sugar. This is called the glycemic index. We want you to eat mostly foods that have a low glycemic index. \par Fruit: choose cherries, grapefruit, prunes, dried apricots, apples, peaches, pears, blueberries, strawberries, oranges, and grapes.  \par Breads and other starches: Choose pumpernickel, rye, sourdough, or stone-ground whole wheat bread. For cereal, choose bran flakes, oat flakes, and oatmeal. For rice, use long-grained white rice. Most pasta is fairly low on the glycemic index; whole wheat or egg pasta is the lowest. Choose new or sweet potatoes and yams.  \par Dairy products: Dairy tends to be fairly low on the glycemic index because of the protein and fat in it. Premium ice cream is lower on the glycemic index than low-fat ice cream.  \par Salty snacks: Hummus, peanuts, walnuts, and cashews are all good choices.  \par Sweets: Most sweets are high on the glycemic index, so make them special treats only. Ones that have protein and fat in them tend to be a bit lower. Milk chocolate or peanut candies are good choices. Pound and sponge cakes are lower on the glycemic index than other types of cakes. For cookies, choose peanut butter, chocolate chip, butter, and oatmeal cookies. For a breakfast treat, choose scones or oatmeal muffins. \par Beans: Choose angely, chickpeas (garbanzo beans), lentils, split peas, lima beans, and kidney beans. \par Meat and vegetables are low on the glycemic index. Soups and stews made with meat or vegetables are also good.\par

## 2021-05-25 NOTE — ADDENDUM
[FreeTextEntry1] : I spent 40 minutes face to face time with the patient, from 10:00 to 10:40 on 5/25/21 by telemedicine (yenny Ledesma followed by phone call). Prior to the visit, informed consent for telemedicine was obtained. Twenty minutes were devoted to patient counseling as outlined above in the End of Visit section. Approximately 15 min were devoted to smoking cessation counseling.
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
## 2022-05-19 NOTE — REASON FOR VISIT
[CV Risk Factors and Non-Cardiac Disease] : CV risk factors and non-cardiac disease [Follow-Up - Clinic] : a clinic follow-up of [Abnormal Test Result] : an abnormal test result [Chest Pain] : chest pain [Hyperlipidemia] : hyperlipidemia

## 2022-05-20 ENCOUNTER — APPOINTMENT (OUTPATIENT)
Dept: CARDIOLOGY | Facility: CLINIC | Age: 67
End: 2022-05-20
Payer: MEDICARE

## 2022-05-20 ENCOUNTER — NON-APPOINTMENT (OUTPATIENT)
Age: 67
End: 2022-05-20

## 2022-05-20 VITALS
SYSTOLIC BLOOD PRESSURE: 98 MMHG | TEMPERATURE: 97.4 F | WEIGHT: 147 LBS | RESPIRATION RATE: 16 BRPM | DIASTOLIC BLOOD PRESSURE: 55 MMHG | OXYGEN SATURATION: 99 % | HEIGHT: 67 IN | BODY MASS INDEX: 23.07 KG/M2 | HEART RATE: 67 BPM

## 2022-05-20 VITALS — OXYGEN SATURATION: 98 % | DIASTOLIC BLOOD PRESSURE: 51 MMHG | SYSTOLIC BLOOD PRESSURE: 85 MMHG | HEART RATE: 65 BPM

## 2022-05-20 VITALS — SYSTOLIC BLOOD PRESSURE: 97 MMHG | DIASTOLIC BLOOD PRESSURE: 55 MMHG | HEART RATE: 62 BPM | OXYGEN SATURATION: 99 %

## 2022-05-20 VITALS — DIASTOLIC BLOOD PRESSURE: 63 MMHG | OXYGEN SATURATION: 98 % | HEART RATE: 70 BPM | SYSTOLIC BLOOD PRESSURE: 100 MMHG

## 2022-05-20 VITALS — SYSTOLIC BLOOD PRESSURE: 106 MMHG | OXYGEN SATURATION: 98 % | HEART RATE: 70 BPM | DIASTOLIC BLOOD PRESSURE: 57 MMHG

## 2022-05-20 DIAGNOSIS — H40.9 UNSPECIFIED GLAUCOMA: ICD-10-CM

## 2022-05-20 DIAGNOSIS — L85.3 XEROSIS CUTIS: ICD-10-CM

## 2022-05-20 DIAGNOSIS — F41.9 ANXIETY DISORDER, UNSPECIFIED: ICD-10-CM

## 2022-05-20 DIAGNOSIS — M76.31 ILIOTIBIAL BAND SYNDROME, RIGHT LEG: ICD-10-CM

## 2022-05-20 DIAGNOSIS — E04.1 NONTOXIC SINGLE THYROID NODULE: ICD-10-CM

## 2022-05-20 DIAGNOSIS — R42 DIZZINESS AND GIDDINESS: ICD-10-CM

## 2022-05-20 LAB
25(OH)D3 SERPL-MCNC: 17.6 NG/ML
ALBUMIN SERPL ELPH-MCNC: 4.6 G/DL
ALP BLD-CCNC: 114 U/L
ALT SERPL-CCNC: 50 U/L
ANION GAP SERPL CALC-SCNC: 13 MMOL/L
APPEARANCE: CLEAR
AST SERPL-CCNC: 36 U/L
BACTERIA: NEGATIVE
BASOPHILS # BLD AUTO: 0.03 K/UL
BASOPHILS NFR BLD AUTO: 0.5 %
BILIRUB SERPL-MCNC: 0.3 MG/DL
BILIRUBIN URINE: NEGATIVE
BLOOD URINE: NEGATIVE
BUN SERPL-MCNC: 16 MG/DL
CALCIUM SERPL-MCNC: 9.4 MG/DL
CALCIUM SERPL-MCNC: 9.4 MG/DL
CHLORIDE SERPL-SCNC: 103 MMOL/L
CHOLEST SERPL-MCNC: 293 MG/DL
CK SERPL-CCNC: 55 U/L
CO2 SERPL-SCNC: 24 MMOL/L
COLOR: YELLOW
CREAT SERPL-MCNC: 0.64 MG/DL
CREAT SPEC-SCNC: 135 MG/DL
CRP SERPL HS-MCNC: 1.57 MG/L
CYSTATIN C SERPL-MCNC: 1.19 MG/L
EGFR: 97 ML/MIN/1.73M2
EOSINOPHIL # BLD AUTO: 0.36 K/UL
EOSINOPHIL NFR BLD AUTO: 5.5 %
ESTIMATED AVERAGE GLUCOSE: 123 MG/DL
GFR/BSA.PRED SERPLBLD CYS-BASED-ARV: 56 ML/MIN/1.73M2
GLUCOSE QUALITATIVE U: NEGATIVE
GLUCOSE SERPL-MCNC: 84 MG/DL
HBA1C MFR BLD HPLC: 5.9 %
HCT VFR BLD CALC: 43 %
HDLC SERPL-MCNC: 43 MG/DL
HGB BLD-MCNC: 13.9 G/DL
HYALINE CASTS: 3 /LPF
IMM GRANULOCYTES NFR BLD AUTO: 0.2 %
KETONES URINE: NEGATIVE
LDLC SERPL CALC-MCNC: 203 MG/DL
LDLC SERPL DIRECT ASSAY-MCNC: 198 MG/DL
LEUKOCYTE ESTERASE URINE: NEGATIVE
LYMPHOCYTES # BLD AUTO: 1.55 K/UL
LYMPHOCYTES NFR BLD AUTO: 23.7 %
MAN DIFF?: NORMAL
MCHC RBC-ENTMCNC: 30.2 PG
MCHC RBC-ENTMCNC: 32.3 GM/DL
MCV RBC AUTO: 93.5 FL
MICROALBUMIN 24H UR DL<=1MG/L-MCNC: 1.3 MG/DL
MICROALBUMIN/CREAT 24H UR-RTO: 10 MG/G
MICROSCOPIC-UA: NORMAL
MONOCYTES # BLD AUTO: 0.65 K/UL
MONOCYTES NFR BLD AUTO: 10 %
NEUTROPHILS # BLD AUTO: 3.93 K/UL
NEUTROPHILS NFR BLD AUTO: 60.1 %
NITRITE URINE: NEGATIVE
NONHDLC SERPL-MCNC: 250 MG/DL
PARATHYROID HORMONE INTACT: 60 PG/ML
PH URINE: 7
PLATELET # BLD AUTO: 201 K/UL
POTASSIUM SERPL-SCNC: 4.3 MMOL/L
PROT SERPL-MCNC: 7.8 G/DL
PROTEIN URINE: NORMAL
RBC # BLD: 4.6 M/UL
RBC # FLD: 14.2 %
RED BLOOD CELLS URINE: 1 /HPF
SODIUM ?TM SUB UR QN: 156 MMOL/L
SODIUM SERPL-SCNC: 140 MMOL/L
SPECIFIC GRAVITY URINE: 1.02
SQUAMOUS EPITHELIAL CELLS: 1 /HPF
T3 SERPL-MCNC: 128 NG/DL
T4 FREE SERPL-MCNC: 1 NG/DL
T4 SERPL-MCNC: 7.6 UG/DL
TRIGL SERPL-MCNC: 234 MG/DL
TSH SERPL-ACNC: 4.59 UIU/ML
URATE SERPL-MCNC: 4.4 MG/DL
UROBILINOGEN URINE: NORMAL
UUN UR-MCNC: 572 MG/DL
WBC # FLD AUTO: 6.53 K/UL
WHITE BLOOD CELLS URINE: 0 /HPF

## 2022-05-20 PROCEDURE — 99215 OFFICE O/P EST HI 40 MIN: CPT

## 2022-05-20 PROCEDURE — 99407 BEHAV CHNG SMOKING > 10 MIN: CPT

## 2022-05-20 PROCEDURE — 93000 ELECTROCARDIOGRAM COMPLETE: CPT

## 2022-05-20 PROCEDURE — 36415 COLL VENOUS BLD VENIPUNCTURE: CPT

## 2022-05-20 PROCEDURE — 93040 RHYTHM ECG WITH REPORT: CPT | Mod: 59

## 2022-05-20 RX ORDER — BUPROPION HYDROCHLORIDE 100 MG/1
100 TABLET, FILM COATED, EXTENDED RELEASE ORAL
Qty: 30 | Refills: 0 | Status: DISCONTINUED | COMMUNITY
Start: 2019-06-21 | End: 2022-05-20

## 2022-05-20 RX ORDER — TRIAMCINOLONE ACETONIDE 0.25 MG/G
0.03 CREAM TOPICAL
Refills: 0 | Status: ACTIVE | COMMUNITY
Start: 2020-10-16

## 2022-05-20 RX ORDER — LATANOPROST/PF 0.005 %
0.01 DROPS OPHTHALMIC (EYE) DAILY
Qty: 1 | Refills: 5 | Status: ACTIVE | COMMUNITY
Start: 2021-03-10

## 2022-05-20 RX ORDER — DICLOFENAC SODIUM 75 MG/1
75 TABLET, DELAYED RELEASE ORAL TWICE DAILY
Refills: 0 | Status: ACTIVE | COMMUNITY
Start: 2022-05-20

## 2022-05-20 RX ORDER — ALBUTEROL SULFATE 90 UG/1
108 (90 BASE) INHALANT RESPIRATORY (INHALATION)
Qty: 1 | Refills: 5 | Status: ACTIVE | COMMUNITY
Start: 2021-01-29

## 2022-05-20 NOTE — DISCUSSION/SUMMARY
[FreeTextEntry1] : \par WEIGHT GOALS:\par \par Category				BMI range - kg/m2	BMI Prime\par Very severely underweight		less than 15		less than 0.60	\par Severely underweight			from 15.0 to 16.0		from 0.60 to 0.64	\par Underweight				from 16.0 to 18.5		from 0.64 to 0.74	\par Normal (healthy weight)			from 18.5 to 25		from 0.74 to 1.0	\par Overweight				from 25 to 30		from 1.0 to 1.2	\par Obese Class I (Moderately obese)		from 30 to 35		from 1.2 to 1.4	\par Obese Class II (Severely obese)		from 35 to 40		from 1.4 to 1.6	\par Obese Class III (Very severely obese)	over 40			over 1.6	\par \par Your current weight is 147 lbs (151 lbs on 5/25/21; 147 lbs on 11/8/19; 145 lbs on 12/14/18). Given current weight and height 5'7", your calculated body mass index (BMI) is 23 kg/sqm. Normal BMI is 18.5-25 kg/sqm. Thus current weight is in the normal category. Abdominal waist circumference is measured at the level of the umbilicus and is thus not a pants waist measurement. Your current abdominal waist circumference is 36.5 inches. Normal abdominal waist circumference is < 32 inches for women and < 37 inches for men\par \par SMOKING CESSATION:\par We all know the health risks of smoking, and most of us know that kicking the habit is the single biggest improvement to health a smoker can make. But that doesn’t make it any easier to kick the habit. Whether you’re a teen smoker or a lifetime pack-a-day smoker, quitting can be tough.\par To increase your chances of success, you need to be motivated, have social support, an understanding of what to expect, and a personal game plan. It is possible to learn how to replace your smoking habits, manage your cravings, and join the millions of people who have kicked the habit for good.\par Smoking tobacco is both a psychological habit and a physical addiction. The act of smoking is ingrained as a daily ritual and, at the same time, the nicotine from cigarettes provides a temporary, and addictive, high. Eliminating that regular fix of nicotine will cause your body to experience physical withdrawal symptoms and cravings. To successfully quit smoking, you’ll need to address both the habit and the addiction by changing your behavior and dealing with nicotine withdrawal symptoms.\par Relieving unpleasant and overwhelming feelings without cigarettes\par Managing unpleasant feelings such as stress, depression, loneliness, fear, and anxiety are some of the most common reasons why adults smoke. When you have a bad day, it can seem like your cigarettes are your only friend. Smoking can temporarily make feelings such as sadness, stress, anxiety, depression, and boredom evaporate into thin air. As much comfort as cigarettes provide, though, it’s important to remember that there are healthier (and more effective) ways to keep unpleasant feelings in check. These may include exercising, meditating, using sensory relaxation strategies, and practicing simple breathing exercises. \par For many people, an important aspect of quitting smoking is to find alternate ways to handle these difficult feelings without smoking. Even when cigarettes are no longer a part of your life, the painful and unpleasant feelings that may have prompted you to smoke in the past will still remain. So, it’s worth spending some time thinking about the different ways you intend to deal with stressful situations and the daily irritations that would normally have you reaching for a cigarette.\par Tailoring a personal game plan to your specific needs and desires can be a big help. List the reasons why you want to quit and then keep copies of the list in the places where you’d normally keep your cigarettes, such as in your jacket, purse, or car. Your reasons for quitting smoking might include:\par I will feel healthier and have more energy, whiter teeth and fresher breath.\par I will lower my risk for cancer, heart attacks, strokes, early death, cataracts, and skin wrinkling.\par I will make myself and my partner, friends, and family proud of me.\par I will no longer expose my children and others to the dangers of my second-hand smoke.\par I will have a healthier baby (If you or your partner is pregnant).\par I will have more money to spend.\par I won't have to worry: "When will I get to smoke next?"\par Questions to ask yourself\par To successfully detach from smoking, you will need to identify and address your smoking habits, the true nature of your dependency, and the techniques that work for you. These types of questions can help:\par Do you feel the need to smoke at every meal?\par Are you more of a social smoker?\par Is it a very bad addiction (more than a pack a day)? Or would a simple nicotine patch do the job?\par Is your cigarette smoking linked to other addictions, such as alcohol or gambling?\par Are you open to hypnotherapy and/or acupuncture?\par Are you someone who is open to talking about your addiction with a therapist or counselor?\par Are you interested in getting into a fitness program?\par Take the time to think of what kind of smoker you are, which moments of your life call for a cigarette, and why. This will help you to identify which tips, techniques or therapies may be most beneficial for you. \par Start your stop smoking plan with START\par S = Set a quit date.\par T = Tell family, friends, and co-workers that you plan to quit.\par A = Anticipate and plan for the challenges you'll face while quitting.\par R = Remove cigarettes and other tobacco products from your home, car, and work.\par T = Talk to your doctor about getting help to quit.\par \par After quitting, you may feel dizzy, restless, or even have strong headaches because you’re lacking the immediate release of sugar that comes from nicotine. You may also have a bigger appetite. These sugar-related cravings should only last a few days until your body adjusts so keep your sugar levels a bit higher than usual on those days by drinking plenty of juice (unless you’re a diabetic). It will help prevent the craving symptoms and help your body re-adjust back to normal.\par Tips for managing other cigarette cravings\par Cravings associated with meals\par For some smokers, ending a meal means lighting up, and the prospect of giving that up may appear daunting. TIP: replace that moment after a meal with something such as a piece of fruit, a (healthy) dessert, a square of chocolate, or a stick of gum.\par \par Alcohol and cigarettes\par Many people have a habit of smoking when they have an alcoholic drink. TIP: try non-alcoholic drinks, or try drinking only in bars, restaurant-bars, or friends’ houses where smoking inside is prohibited. Or try snacking on nuts and chips, or chewing on a straw or cocktail stick.\par \par Cravings associated with social smoking\par When friends, family, and co-workers smoke around you, it is doubly difficult to quit or avoid relapse. TIP: Your social circles need to know that you are changing your habits so talk about your decision to quit. Let them know they won’t be able to smoke when you’re in the car with them or taking a coffee break together. \par \par In your workplace, don’t take all your coffee breaks with smokers only, do something else instead, or find non-smokers to have your breaks with.\par Additional tips to deal with cravings and withdrawal symptoms\par Stay active: Keep yourself distracted and occupied, go for walks.\par Keep your hands/fingers busy: Squeeze balls, pencils, or paper clips are good substitutes to satisfy that need for tactile stimulation.\par Keep your mind busy: Read a book or magazine, listen to some music you love.\par Find an oral substitute: Keep other things around to pop in your mouth when you’re craving a cigarette.\par Good choices include mints, hard candy, carrot or celery sticks, gum, and sunflower seeds.\par Drink lots of water: Flushing toxins from your body minimizes withdrawal symptoms and helps cravings pass faster.\par Look for new ways to relax and to cope with depression or anxiety: There are a lot of ways to improve your mood without smoking. \par \par Finding the resources and support to quit smoking\par There are many different methods that have successfully helped people to quit smoking, including:\par Quitting smoking cold turkey.\par Systematically decreasing the number of cigarettes you smoke.\par Reducing your intake of nicotine gradually over time.\par Using nicotine replacement therapy or non-nicotine medications to reduce withdrawal symptoms.\par Utilizing nicotine support groups.\par Trying hypnosis, acupuncture, or counseling using cognitive behavioral techniques.\par You may be successful with the first method you try. More likely, you’ll have to try a number of different methods or a combination of treatments to find the ones that work best for you.\par \par Medication therapy\par Smoking cessation medications can ease withdrawal symptoms and reduce cravings, and are most effective when used as part of a comprehensive stop smoking program monitored by your physician. Talk to your doctor about your options and whether an anti-smoking medication is right for you. U.S[place] Food and Drug Administration (FDA) approved options are: \par \par Nicotine Replacement Therapy\par Nicotine replacement therapy involves "replacing" cigarettes with other nicotine substitutes, such as nicotine gum or a nicotine patch. It works by delivering small and steady doses of nicotine into the body to relieve some of the withdrawal symptoms without the tars and poisonous gases found in cigarettes. This type of treatment helps smokers focus on breaking their psychological addiction and makes it easier to concentrate on learning new behaviors and coping skills.\par \par Non-Nicotine Medication\par These medications help you stop smoking by reducing cravings and withdrawal symptoms without the use of nicotine. Medications such as bupropion (Zyban) and varenicline (Chantix) are intended for short-term use only.\par \par Non-medication therapies\par There are several things you can do to stop smoking that don’t involve nicotine replacement therapy or prescription medications:\par Hypnosis\par A popular option that has produced good results. Forget anything you may have seen from stage hypnotists, hypnosis works by getting you into a deeply relaxed state where you are open to suggestions that strengthen your resolve to quit smoking and increase your negative feelings toward cigarettes. Ask your doctor to recommend a qualified smoking cessation hypnotherapist in your area or refer to the American Society of Clinical Hypnosis (ASCH) for guidelines on selecting a qualified professional.\par Acupuncture\par One of the oldest known medical techniques, acupuncture is believed to work by triggering the release of endorphins (natural pain relievers) that allow the body to relax. As a smoking cessation aid, acupuncture can be helpful in managing smoking withdrawal symptoms. Ask your doctor for a referral or search for a local practitioner at the American Association of Acupuncture and Meally [employer] (AAAOM).\par Behavioral Therapy\par Nicotine addiction is related to the habitual behaviors (the “rituals”) involved in smoking. Behavior therapy focuses on learning new coping skills and breaking those habits. The American Lung Association offers a free online smoking cessation program that focuses on behavioral change. To find a local behavioral therapist, check with your doctor or search at the Association for Behavioral and Cognitive Therapies (ABCT).\par Motivational Therapies\par Self-help books and websites can provide a number of ways to motivate yourself to quit smoking. One well known example is calculating the monetary savings. Some people have been able to find the motivation to quit just by calculating how much money they will save after they quit. It may be enough to pay for a summer vacation.\par \par [facility]\par [address]\[place] [address]\par [phone]\par [email or web address]\par **Referral made on 5/25/21.\par \par GLYCEMIC INDEX:\par Foods can be measured by how much they are likely to raise blood sugar. This is called the glycemic index. We want you to eat mostly foods that have a low glycemic index. \par Fruit: choose cherries, grapefruit, prunes, dried apricots, apples, peaches, pears, blueberries, strawberries, oranges, and grapes.  \par Breads and other starches: Choose pumpernickel, rye, sourdough, or stone-ground whole wheat bread. For cereal, choose bran flakes, oat flakes, and oatmeal. For rice, use long-grained white rice. Most pasta is fairly low on the glycemic index; whole wheat or egg pasta is the lowest. Choose new or sweet potatoes and yams.  \par Dairy products: Dairy tends to be fairly low on the glycemic index because of the protein and fat in it. Premium ice cream is lower on the glycemic index than low-fat ice cream.  \par Salty snacks: Hummus, peanuts, walnuts, and cashews are all good choices.  \par Sweets: Most sweets are high on the glycemic index, so make them special treats only. Ones that have protein and fat in them tend to be a bit lower. Milk chocolate or peanut candies are good choices. Pound and sponge cakes are lower on the glycemic index than other types of cakes. For cookies, choose peanut butter, chocolate chip, butter, and oatmeal cookies. For a breakfast treat, choose scones or oatmeal muffins. \par Beans: Choose angely, chickpeas (garbanzo beans), lentils, split peas, lima beans, and kidney beans. \par Meat and vegetables are low on the glycemic index. Soups and stews made with meat or vegetables are also good.\par

## 2022-05-20 NOTE — CARDIOLOGY SUMMARY
[___] : [unfilled] [Normal] : normal [de-identified] : 5/20/22, normal sinus rhythm at 62 bpm with low voltage with rightward P-axis and rotation, possible pulmonary disease. ST-segments and T-waves were normal. Prior ECG 5/5/21 showed normal sinus rhythm at 66 bpm with nonspecific T-abnormality. There was  rightward P-axis and rotation, possible pulmonary disease. There was non-specific ST-segment and T-wave abnormality. Rhythm / RR - interval analysis 5/20/22 observed 91 beats over 90 sec with mean HR 63 bpm; mean  ms (886 - 1018); Max/Min 114%; RR SD 31 and RR CV 3%. There were no PVCs or PACs [de-identified] : 5/24/19, exercised 5 min Andrés protocol, achieving 5 METS. Baseline HR 71 bpm increased to peak 144 bpm (92% MPHR). Baseline BP 98/57 mm Hg increased to peak 138/57 mm Hg. There were no ST-segment changes or arrhythmias on ECG. There was a small, mild defect in the inferoseptal wall that was fixed and mostly corrected with prone imaging for attenuation correction. Therefore no evidence of infarction or stress induced ischemia. There was no segmental wall motion abnormality. LVEF 58% and ED 71 mL [de-identified] : 11/13/17, normal LV size and overall function with visual estimation of LVEF 56 – 60%. There was basal inferior segmental wall motion abnormality. There was mild LV diastolic dysfunction. RV size and function were normal. The LA and RA were normal in size. There was mild aortic valve sclerosis, without stenosis. There was mild thickening of the anterior and posterior mitral valve leaflets, with trace regurgitation. There was mild to moderate tricuspid regurgitation and mild pulmonic regurgitation. Estimated RVSP 24.6 mm Hg, which is normal. There was trivial pericardial effusion [de-identified] : 2006, normal

## 2022-05-20 NOTE — COUNSELING
[Participate in Class] : Participate in class [Yes] : Risk of tobacco use and health benefits of smoking cessation discussed: Yes [Cessation strategies including cessation program discussed] : Cessation strategies including cessation program discussed [Use of nicotine replacement therapies and other medications discussed] : Use of nicotine replacement therapies and other medications discussed [Smoking Cessation Program Referral] : Smoking Cessation Program Referral  [FreeTextEntry2] : [facility]  [FreeTextEntry3] : 15
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
## 2022-05-20 NOTE — PHYSICAL EXAM
[General Appearance - Well Developed] : well developed [Normal Appearance] : normal appearance [Well Groomed] : well groomed [General Appearance - Well Nourished] : well nourished [No Deformities] : no deformities [General Appearance - In No Acute Distress] : no acute distress [Normal Conjunctiva] : the conjunctiva exhibited no abnormalities [Eyelids - No Xanthelasma] : the eyelids demonstrated no xanthelasmas [Normal Oral Mucosa] : normal oral mucosa [No Oral Pallor] : no oral pallor [No Oral Cyanosis] : no oral cyanosis [Normal Jugular Venous A Waves Present] : normal jugular venous A waves present [Normal Jugular Venous V Waves Present] : normal jugular venous V waves present [No Jugular Venous Pena A Waves] : no jugular venous pena A waves [Abdomen Soft] : soft [Abdomen Tenderness] : non-tender [Abdomen Mass (___ Cm)] : no abdominal mass palpated [Abnormal Walk] : normal gait [Gait - Sufficient For Exercise Testing] : the gait was sufficient for exercise testing [Nail Clubbing] : no clubbing of the fingernails [Cyanosis, Localized] : no localized cyanosis [Petechial Hemorrhages (___cm)] : no petechial hemorrhages [Skin Color & Pigmentation] : normal skin color and pigmentation [] : no rash [No Venous Stasis] : no venous stasis [Skin Lesions] : no skin lesions [No Skin Ulcers] : no skin ulcer [No Xanthoma] : no  xanthoma was observed [Oriented To Time, Place, And Person] : oriented to person, place, and time [Affect] : the affect was normal [Mood] : the mood was normal [No Anxiety] : not feeling anxious [5th Left ICS - MCL] : palpated at the 5th LICS in the midclavicular line [Normal] : normal [No Precordial Heave] : no precordial heave was noted [Normal Rate] : normal [Rhythm Regular] : regular [Normal S1] : normal S1 [Normal S2] : normal S2 [No Gallop] : no gallop heard [No Murmur] : no murmurs heard [2+] : left 2+ [No Abnormalities] : the abdominal aorta was not enlarged and no bruit was heard [No Pitting Edema] : no pitting edema present [Prolonged Exp Time] : with normal expiratory time [Increased E/I Ratio] : with normal expiratory/inspiratory ratio  [FreeTextEntry1] : R ankle circumf 6.5"; L ankle 7.0" (previously 7.0 in and 7.25 in respectively) [Apical Thrill] : no thrill palpable at the apex [Click] : no click [Pericardial Rub] : no pericardial rub [Right Carotid Bruit] : no bruit heard over the right carotid [Left Carotid Bruit] : no bruit heard over the left carotid [Rt] : no varicose veins of the right leg [Lt] : no varicose veins of the left leg

## 2022-05-20 NOTE — ADDENDUM
[FreeTextEntry1] : I spent 60 minutes face to face time with the patient, from 11:00 to 12:00 on 5/20/22. Thirty minutes were devoted to patient counseling as outlined above in the End of Visit section. Approximately 15 min were devoted to smoking cessation counseling.

## 2022-05-20 NOTE — REVIEW OF SYSTEMS
[Thigh Pain] : pain in the thigh [Negative] : Integumentary [Weight Gain (___ Lbs)] : no recent weight gain [Weight Loss (___ Lbs)] : no recent weight loss [Dizziness] : no dizziness [Convulsions] : no convulsions [de-identified] : lightheadedness

## 2022-05-22 LAB
THYROGLOB AB SERPL-ACNC: 67.5 IU/ML
THYROGLOB SERPL-MCNC: 7.81 NG/ML
THYROPEROXIDASE AB SERPL IA-ACNC: 1131 IU/ML

## 2022-05-24 LAB — TSI ACT/NOR SER: <0.1 IU/L

## 2022-05-26 ENCOUNTER — NON-APPOINTMENT (OUTPATIENT)
Age: 67
End: 2022-05-26

## 2022-05-31 ENCOUNTER — NON-APPOINTMENT (OUTPATIENT)
Age: 67
End: 2022-05-31

## 2022-05-31 DIAGNOSIS — R13.12 DYSPHAGIA, OROPHARYNGEAL PHASE: ICD-10-CM

## 2022-06-17 ENCOUNTER — APPOINTMENT (OUTPATIENT)
Dept: CARDIOLOGY | Facility: CLINIC | Age: 67
End: 2022-06-17

## 2023-05-16 ENCOUNTER — RESULT CHARGE (OUTPATIENT)
Age: 68
End: 2023-05-16

## 2023-06-02 ENCOUNTER — APPOINTMENT (OUTPATIENT)
Dept: CARDIOLOGY | Facility: CLINIC | Age: 68
End: 2023-06-02
Payer: MEDICARE

## 2023-06-02 ENCOUNTER — NON-APPOINTMENT (OUTPATIENT)
Age: 68
End: 2023-06-02

## 2023-06-02 VITALS
SYSTOLIC BLOOD PRESSURE: 90 MMHG | DIASTOLIC BLOOD PRESSURE: 54 MMHG | HEART RATE: 60 BPM | RESPIRATION RATE: 16 BRPM | OXYGEN SATURATION: 98 % | WEIGHT: 145 LBS | BODY MASS INDEX: 22.76 KG/M2 | HEIGHT: 67 IN

## 2023-06-02 VITALS — SYSTOLIC BLOOD PRESSURE: 88 MMHG | HEART RATE: 64 BPM | OXYGEN SATURATION: 98 % | DIASTOLIC BLOOD PRESSURE: 53 MMHG

## 2023-06-02 VITALS — OXYGEN SATURATION: 99 % | SYSTOLIC BLOOD PRESSURE: 88 MMHG | HEART RATE: 60 BPM | DIASTOLIC BLOOD PRESSURE: 53 MMHG

## 2023-06-02 VITALS — SYSTOLIC BLOOD PRESSURE: 107 MMHG | OXYGEN SATURATION: 98 % | HEART RATE: 66 BPM | DIASTOLIC BLOOD PRESSURE: 55 MMHG

## 2023-06-02 VITALS — HEART RATE: 69 BPM | SYSTOLIC BLOOD PRESSURE: 106 MMHG | DIASTOLIC BLOOD PRESSURE: 55 MMHG | OXYGEN SATURATION: 99 %

## 2023-06-02 VITALS — HEART RATE: 67 BPM | SYSTOLIC BLOOD PRESSURE: 95 MMHG | DIASTOLIC BLOOD PRESSURE: 53 MMHG | OXYGEN SATURATION: 99 %

## 2023-06-02 DIAGNOSIS — Z91.14 PATIENT'S OTHER NONCOMPLIANCE WITH MEDICATION REGIMEN: ICD-10-CM

## 2023-06-02 DIAGNOSIS — Z71.3 DIETARY COUNSELING AND SURVEILLANCE: ICD-10-CM

## 2023-06-02 DIAGNOSIS — R07.89 OTHER CHEST PAIN: ICD-10-CM

## 2023-06-02 DIAGNOSIS — Z78.9 OTHER SPECIFIED HEALTH STATUS: ICD-10-CM

## 2023-06-02 DIAGNOSIS — I95.1 ORTHOSTATIC HYPOTENSION: ICD-10-CM

## 2023-06-02 DIAGNOSIS — E06.3 AUTOIMMUNE THYROIDITIS: ICD-10-CM

## 2023-06-02 DIAGNOSIS — I95.9 HYPOTENSION, UNSPECIFIED: ICD-10-CM

## 2023-06-02 DIAGNOSIS — E78.5 HYPERLIPIDEMIA, UNSPECIFIED: ICD-10-CM

## 2023-06-02 DIAGNOSIS — R73.09 OTHER ABNORMAL GLUCOSE: ICD-10-CM

## 2023-06-02 DIAGNOSIS — R06.2 WHEEZING: ICD-10-CM

## 2023-06-02 DIAGNOSIS — F17.200 NICOTINE DEPENDENCE, UNSPECIFIED, UNCOMPLICATED: ICD-10-CM

## 2023-06-02 PROCEDURE — 36415 COLL VENOUS BLD VENIPUNCTURE: CPT

## 2023-06-02 PROCEDURE — 99407 BEHAV CHNG SMOKING > 10 MIN: CPT

## 2023-06-02 PROCEDURE — 99215 OFFICE O/P EST HI 40 MIN: CPT

## 2023-06-02 PROCEDURE — 93040 RHYTHM ECG WITH REPORT: CPT | Mod: 59

## 2023-06-02 PROCEDURE — 93000 ELECTROCARDIOGRAM COMPLETE: CPT

## 2023-06-02 RX ORDER — BUPROPION HYDROCHLORIDE 100 MG/1
100 TABLET, FILM COATED ORAL TWICE DAILY
Qty: 180 | Refills: 3 | Status: ACTIVE | COMMUNITY
Start: 2023-06-02

## 2023-06-02 RX ORDER — CHLORHEXIDINE GLUCONATE 4 %
1000 LIQUID (ML) TOPICAL DAILY
Qty: 90 | Refills: 3 | Status: ACTIVE | COMMUNITY
Start: 2023-06-02

## 2023-06-02 RX ORDER — ROSUVASTATIN CALCIUM 5 MG/1
5 TABLET, FILM COATED ORAL DAILY
Qty: 90 | Refills: 3 | Status: ACTIVE | COMMUNITY
Start: 2019-11-12 | End: 1900-01-01

## 2023-06-02 RX ORDER — EZETIMIBE 10 MG/1
10 TABLET ORAL DAILY
Qty: 90 | Refills: 3 | Status: ACTIVE | COMMUNITY
Start: 2023-06-02 | End: 1900-01-01

## 2023-06-02 RX ORDER — DORZOLAMIDE HYDROCHLORIDE 20 MG/ML
2 SOLUTION OPHTHALMIC TWICE DAILY
Qty: 1 | Refills: 5 | Status: ACTIVE | COMMUNITY
Start: 2023-06-02

## 2023-06-02 RX ORDER — CHOLECALCIFEROL (VITAMIN D3) 1250 MCG
1250 TABLET ORAL WEEKLY
Qty: 5 | Refills: 5 | Status: ACTIVE | COMMUNITY
Start: 2018-12-16

## 2023-06-02 NOTE — ADDENDUM
[FreeTextEntry1] : I spent 60 minutes face to face time with the patient, from 11:00 to 12:00 on 6/2/23. Thirty minutes were devoted to patient counseling as outlined above in the End of Visit section. Approximately 15 min were devoted to smoking cessation counseling.

## 2023-06-02 NOTE — PHYSICAL EXAM
[General Appearance - Well Developed] : well developed [Normal Appearance] : normal appearance [Well Groomed] : well groomed [General Appearance - Well Nourished] : well nourished [No Deformities] : no deformities [General Appearance - In No Acute Distress] : no acute distress [Normal Conjunctiva] : the conjunctiva exhibited no abnormalities [Eyelids - No Xanthelasma] : the eyelids demonstrated no xanthelasmas [Normal Oral Mucosa] : normal oral mucosa [No Oral Pallor] : no oral pallor [No Oral Cyanosis] : no oral cyanosis [Normal Jugular Venous A Waves Present] : normal jugular venous A waves present [Normal Jugular Venous V Waves Present] : normal jugular venous V waves present [No Jugular Venous Pena A Waves] : no jugular venous pena A waves [Abdomen Soft] : soft [Abdomen Tenderness] : non-tender [Abdomen Mass (___ Cm)] : no abdominal mass palpated [Gait - Sufficient For Exercise Testing] : the gait was sufficient for exercise testing [Abnormal Walk] : normal gait [Nail Clubbing] : no clubbing of the fingernails [Cyanosis, Localized] : no localized cyanosis [Petechial Hemorrhages (___cm)] : no petechial hemorrhages [Skin Color & Pigmentation] : normal skin color and pigmentation [] : no rash [No Venous Stasis] : no venous stasis [Skin Lesions] : no skin lesions [No Skin Ulcers] : no skin ulcer [No Xanthoma] : no  xanthoma was observed [Oriented To Time, Place, And Person] : oriented to person, place, and time [Affect] : the affect was normal [Mood] : the mood was normal [No Anxiety] : not feeling anxious [5th Left ICS - MCL] : palpated at the 5th LICS in the midclavicular line [Normal] : normal [No Precordial Heave] : no precordial heave was noted [Normal Rate] : normal [Rhythm Regular] : regular [Normal S1] : normal S1 [Normal S2] : normal S2 [No Gallop] : no gallop heard [No Murmur] : no murmurs heard [2+] : left 2+ [No Abnormalities] : the abdominal aorta was not enlarged and no bruit was heard [No Pitting Edema] : no pitting edema present [Diffuse Wheezing] : diffuse wheezing [Prolonged Exp Time] : with normal expiratory time [Increased E/I Ratio] : with normal expiratory/inspiratory ratio  [FreeTextEntry1] : R ankle circumf 7.0"; L ankle 7.0" (previously 6.5 in and 7.0" respectively) [Apical Thrill] : no thrill palpable at the apex [Click] : no click [Pericardial Rub] : no pericardial rub [Right Carotid Bruit] : no bruit heard over the right carotid [Left Carotid Bruit] : no bruit heard over the left carotid [Rt] : no varicose veins of the right leg [Lt] : no varicose veins of the left leg

## 2023-06-02 NOTE — CARDIOLOGY SUMMARY
[de-identified] : 6/2/23, normal sinus rhythm at 67 bpm, with leftward axis. There was rsr' pattern in lead V1. ST-segments and T-waves were normal. Prior ECG 5/20/22 showed normal sinus rhythm at 62 bpm with low voltage with rightward P-axis and rotation, possible pulmonary disease. ST-segments and T-waves were normal. Rhythm / RR - interval analysis 6/2/23 observed 98 beats over 90 sec with mean HR 68 bpm; mean  ms ([id number]); Max/Min 126%; RR SD 43 and RR CV 4%. There were no PVCs or PACs [de-identified] : 5/24/19, exercised 5 min Andrés protocol, achieving 5 METS. Baseline HR 71 bpm increased to peak 144 bpm (92% MPHR). Baseline BP 98/57 mm Hg increased to peak 138/57 mm Hg. There were no ST-segment changes or arrhythmias on ECG. There was a small, mild defect in the inferoseptal wall that was fixed and mostly corrected with prone imaging for attenuation correction. Therefore no evidence of infarction or stress induced ischemia. There was no segmental wall motion abnormality. LVEF 58% and ED 71 mL [de-identified] : 11/13/17, normal LV size and overall function with visual estimation of LVEF 56 – 60%. There was basal inferior segmental wall motion abnormality. There was mild LV diastolic dysfunction. RV size and function were normal. The LA and RA were normal in size. There was mild aortic valve sclerosis, without stenosis. There was mild thickening of the anterior and posterior mitral valve leaflets, with trace regurgitation. There was mild to moderate tricuspid regurgitation and mild pulmonic regurgitation. Estimated RVSP 24.6 mm Hg, which is normal. There was trivial pericardial effusion [de-identified] : 2006, normal [de-identified] : Thyroid biopsy: 2/25/22, hyperplastic colloid nodule with atypia ([place] Category II). Negative for malignant cells. 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

## 2023-06-02 NOTE — DISCUSSION/SUMMARY
[FreeTextEntry1] : \par WEIGHT GOALS:\par \par Category				BMI range - kg/m2	BMI Prime\par Very severely underweight		less than 15		less than 0.60	\par Severely underweight			from 15.0 to 16.0		from 0.60 to 0.64	\par Underweight				from 16.0 to 18.5		from 0.64 to 0.74	\par Normal (healthy weight)			from 18.5 to 25		from 0.74 to 1.0	\par Overweight				from 25 to 30		from 1.0 to 1.2	\par Obese Class I (Moderately obese)		from 30 to 35		from 1.2 to 1.4	\par Obese Class II (Severely obese)		from 35 to 40		from 1.4 to 1.6	\par Obese Class III (Very severely obese)	over 40			over 1.6	\par \par Your current weight is 145 lbs (147 lbs on 5/20/22; 151 lbs on 5/25/21; 147 lbs on 11/8/19; 145 lbs on 12/14/18). Given current weight and height 5'7", your calculated body mass index (BMI) is 23 kg/sqm. Normal BMI is 18.5-25 kg/sqm. Thus current weight is in the normal category. Abdominal waist circumference is measured at the level of the umbilicus and is thus not a pants waist measurement. Your current abdominal waist circumference is 35.5" (36.5" on 5/20/22). Normal abdominal waist circumference is < 32 inches for women and < 37 inches for men\par \par SMOKING CESSATION:\par We all know the health risks of smoking, and most of us know that kicking the habit is the single biggest improvement to health a smoker can make. But that doesn’t make it any easier to kick the habit. Whether you’re a teen smoker or a lifetime pack-a-day smoker, quitting can be tough.\par To increase your chances of success, you need to be motivated, have social support, an understanding of what to expect, and a personal game plan. It is possible to learn how to replace your smoking habits, manage your cravings, and join the millions of people who have kicked the habit for good.\par Smoking tobacco is both a psychological habit and a physical addiction. The act of smoking is ingrained as a daily ritual and, at the same time, the nicotine from cigarettes provides a temporary, and addictive, high. Eliminating that regular fix of nicotine will cause your body to experience physical withdrawal symptoms and cravings. To successfully quit smoking, you’ll need to address both the habit and the addiction by changing your behavior and dealing with nicotine withdrawal symptoms.\par Relieving unpleasant and overwhelming feelings without cigarettes\par Managing unpleasant feelings such as stress, depression, loneliness, fear, and anxiety are some of the most common reasons why adults smoke. When you have a bad day, it can seem like your cigarettes are your only friend. Smoking can temporarily make feelings such as sadness, stress, anxiety, depression, and boredom evaporate into thin air. As much comfort as cigarettes provide, though, it’s important to remember that there are healthier (and more effective) ways to keep unpleasant feelings in check. These may include exercising, meditating, using sensory relaxation strategies, and practicing simple breathing exercises. \par For many people, an important aspect of quitting smoking is to find alternate ways to handle these difficult feelings without smoking. Even when cigarettes are no longer a part of your life, the painful and unpleasant feelings that may have prompted you to smoke in the past will still remain. So, it’s worth spending some time thinking about the different ways you intend to deal with stressful situations and the daily irritations that would normally have you reaching for a cigarette.\par Tailoring a personal game plan to your specific needs and desires can be a big help. List the reasons why you want to quit and then keep copies of the list in the places where you’d normally keep your cigarettes, such as in your jacket, purse, or car. Your reasons for quitting smoking might include:\par I will feel healthier and have more energy, whiter teeth and fresher breath.\par I will lower my risk for cancer, heart attacks, strokes, early death, cataracts, and skin wrinkling.\par I will make myself and my partner, friends, and family proud of me.\par I will no longer expose my children and others to the dangers of my second-hand smoke.\par I will have a healthier baby (If you or your partner is pregnant).\par I will have more money to spend.\par I won't have to worry: "When will I get to smoke next?"\par Questions to ask yourself\par To successfully detach from smoking, you will need to identify and address your smoking habits, the true nature of your dependency, and the techniques that work for you. These types of questions can help:\par Do you feel the need to smoke at every meal?\par Are you more of a social smoker?\par Is it a very bad addiction (more than a pack a day)? Or would a simple nicotine patch do the job?\par Is your cigarette smoking linked to other addictions, such as alcohol or gambling?\par Are you open to hypnotherapy and/or acupuncture?\par Are you someone who is open to talking about your addiction with a therapist or counselor?\par Are you interested in getting into a fitness program?\par Take the time to think of what kind of smoker you are, which moments of your life call for a cigarette, and why. This will help you to identify which tips, techniques or therapies may be most beneficial for you. \par Start your stop smoking plan with START\par S = Set a quit date.\par T = Tell family, friends, and co-workers that you plan to quit.\par A = Anticipate and plan for the challenges you'll face while quitting.\par R = Remove cigarettes and other tobacco products from your home, car, and work.\par T = Talk to your doctor about getting help to quit.\par \par After quitting, you may feel dizzy, restless, or even have strong headaches because you’re lacking the immediate release of sugar that comes from nicotine. You may also have a bigger appetite. These sugar-related cravings should only last a few days until your body adjusts so keep your sugar levels a bit higher than usual on those days by drinking plenty of juice (unless you’re a diabetic). It will help prevent the craving symptoms and help your body re-adjust back to normal.\par Tips for managing other cigarette cravings\par Cravings associated with meals\par For some smokers, ending a meal means lighting up, and the prospect of giving that up may appear daunting. TIP: replace that moment after a meal with something such as a piece of fruit, a (healthy) dessert, a square of chocolate, or a stick of gum.\par \par Alcohol and cigarettes\par Many people have a habit of smoking when they have an alcoholic drink. TIP: try non-alcoholic drinks, or try drinking only in bars, restaurant-bars, or friends’ houses where smoking inside is prohibited. Or try snacking on nuts and chips, or chewing on a straw or cocktail stick.\par \par Cravings associated with social smoking\par When friends, family, and co-workers smoke around you, it is doubly difficult to quit or avoid relapse. TIP: Your social circles need to know that you are changing your habits so talk about your decision to quit. Let them know they won’t be able to smoke when you’re in the car with them or taking a coffee break together. \par \par In your workplace, don’t take all your coffee breaks with smokers only, do something else instead, or find non-smokers to have your breaks with.\par Additional tips to deal with cravings and withdrawal symptoms\par Stay active: Keep yourself distracted and occupied, go for walks.\par Keep your hands/fingers busy: Squeeze balls, pencils, or paper clips are good substitutes to satisfy that need for tactile stimulation.\par Keep your mind busy: Read a book or magazine, listen to some music you love.\par Find an oral substitute: Keep other things around to pop in your mouth when you’re craving a cigarette.\par Good choices include mints, hard candy, carrot or celery sticks, gum, and sunflower seeds.\par Drink lots of water: Flushing toxins from your body minimizes withdrawal symptoms and helps cravings pass faster.\par Look for new ways to relax and to cope with depression or anxiety: There are a lot of ways to improve your mood without smoking. \par \par Finding the resources and support to quit smoking\par There are many different methods that have successfully helped people to quit smoking, including:\par Quitting smoking cold turkey.\par Systematically decreasing the number of cigarettes you smoke.\par Reducing your intake of nicotine gradually over time.\par Using nicotine replacement therapy or non-nicotine medications to reduce withdrawal symptoms.\par Utilizing nicotine support groups.\par Trying hypnosis, acupuncture, or counseling using cognitive behavioral techniques.\par You may be successful with the first method you try. More likely, you’ll have to try a number of different methods or a combination of treatments to find the ones that work best for you.\par \par Medication therapy\par Smoking cessation medications can ease withdrawal symptoms and reduce cravings, and are most effective when used as part of a comprehensive stop smoking program monitored by your physician. Talk to your doctor about your options and whether an anti-smoking medication is right for you. U.S. Food and Drug Administration (FDA) approved options are: \par \par Nicotine Replacement Therapy\par Nicotine replacement therapy involves "replacing" cigarettes with other nicotine substitutes, such as nicotine gum or a nicotine patch. It works by delivering small and steady doses of nicotine into the body to relieve some of the withdrawal symptoms without the tars and poisonous gases found in cigarettes. This type of treatment helps smokers focus on breaking their psychological addiction and makes it easier to concentrate on learning new behaviors and coping skills.\par \par Non-Nicotine Medication\par These medications help you stop smoking by reducing cravings and withdrawal symptoms without the use of nicotine. Medications such as bupropion (Zyban) and varenicline (Chantix) are intended for short-term use only.\par \par Non-medication therapies\par There are several things you can do to stop smoking that don’t involve nicotine replacement therapy or prescription medications:\par Hypnosis\par A popular option that has produced good results. Forget anything you may have seen from stage hypnotists, hypnosis works by getting you into a deeply relaxed state where you are open to suggestions that strengthen your resolve to quit smoking and increase your negative feelings toward cigarettes. Ask your doctor to recommend a qualified smoking cessation hypnotherapist in your area or refer to the American Society of Clinical Hypnosis (ASCH) for guidelines on selecting a qualified professional.\par Acupuncture\par One of the oldest known medical techniques, acupuncture is believed to work by triggering the release of endorphins (natural pain relievers) that allow the body to relax. As a smoking cessation aid, acupuncture can be helpful in managing smoking withdrawal symptoms. Ask your doctor for a referral or search for a local practitioner at the American Association of Acupuncture and [place] [facility] (AAAOM).\par Behavioral Therapy\par Nicotine addiction is related to the habitual behaviors (the “rituals”) involved in smoking. Behavior therapy focuses on learning new coping skills and breaking those habits. The American Lung Association offers a free online smoking cessation program that focuses on behavioral change. To find a local behavioral therapist, check with your doctor or search at the Association for Behavioral and Cognitive Therapies (ABCT).\par Motivational Therapies\par Self-help books and websites can provide a number of ways to motivate yourself to quit smoking. One well known example is calculating the monetary savings. Some people have been able to find the motivation to quit just by calculating how much money they will save after they quit. It may be enough to pay for a summer vacation.\par \par [facility]\par [address]\[place] [address]\par [phone]\par [email or web address]\par **Referral made on 5/25/21.\par \par GLYCEMIC INDEX:\par Foods can be measured by how much they are likely to raise blood sugar. This is called the glycemic index. We want you to eat mostly foods that have a low glycemic index. \par Fruit: choose cherries, grapefruit, prunes, dried apricots, apples, peaches, pears, blueberries, strawberries, oranges, and grapes.  \par Breads and other starches: Choose pumpernickel, rye, sourdough, or stone-ground whole wheat bread. For cereal, choose bran flakes, oat flakes, and oatmeal. For rice, use long-grained white rice. Most pasta is fairly low on the glycemic index; whole wheat or egg pasta is the lowest. Choose new or sweet potatoes and yams.  \par Dairy products: Dairy tends to be fairly low on the glycemic index because of the protein and fat in it. Premium ice cream is lower on the glycemic index than low-fat ice cream.  \par Salty snacks: Hummus, peanuts, walnuts, and cashews are all good choices.  \par Sweets: Most sweets are high on the glycemic index, so make them special treats only. Ones that have protein and fat in them tend to be a bit lower. Milk chocolate or peanut candies are good choices. Pound and sponge cakes are lower on the glycemic index than other types of cakes. For cookies, choose peanut butter, chocolate chip, butter, and oatmeal cookies. For a breakfast treat, choose scones or oatmeal muffins. \par Beans: Choose angely, chickpeas (garbanzo beans), lentils, split peas, lima beans, and kidney beans. \par Meat and vegetables are low on the glycemic index. Soups and stews made with meat or vegetables are also good.\par

## 2023-06-02 NOTE — COUNSELING
[Cessation strategies including cessation program discussed] : Cessation strategies including cessation program discussed [Use of nicotine replacement therapies and other medications discussed] : Use of nicotine replacement therapies and other medications discussed [Smoking Cessation Program Referral] : Smoking Cessation Program Referral  [Participate in Class] : Participate in class [Yes] : Willing to quit smoking [FreeTextEntry3] : 15 [FreeTextEntry2] : [facility]
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
## 2023-06-02 NOTE — REVIEW OF SYSTEMS
[Thigh Pain] : pain in the thigh [Negative] : Heme/Lymph [Weight Gain (___ Lbs)] : no recent weight gain [Weight Loss (___ Lbs)] : no recent weight loss [Dizziness] : no dizziness [Convulsions] : no convulsions [FreeTextEntry6] : see HPI [de-identified] : lightheadedness

## 2023-06-03 LAB
25(OH)D3 SERPL-MCNC: 40.2 NG/ML
ALBUMIN SERPL ELPH-MCNC: 4.1 G/DL
ALP BLD-CCNC: 74 U/L
ALT SERPL-CCNC: 12 U/L
ANION GAP SERPL CALC-SCNC: 14 MMOL/L
APPEARANCE: CLEAR
AST SERPL-CCNC: 16 U/L
BACTERIA: NEGATIVE /HPF
BILIRUB SERPL-MCNC: 0.2 MG/DL
BILIRUBIN URINE: NEGATIVE
BLOOD URINE: NEGATIVE
BUN SERPL-MCNC: 11 MG/DL
CALCIUM OXALATE CRYSTALS: PRESENT
CALCIUM SERPL-MCNC: 9 MG/DL
CAST: 2 /LPF
CHLORIDE SERPL-SCNC: 105 MMOL/L
CHOLEST SERPL-MCNC: 197 MG/DL
CK SERPL-CCNC: 82 U/L
CO2 SERPL-SCNC: 26 MMOL/L
COLOR: NORMAL
CREAT SERPL-MCNC: 0.64 MG/DL
CRP SERPL HS-MCNC: 1.01 MG/L
EGFR: 96 ML/MIN/1.73M2
EPITHELIAL CELLS: 1 /HPF
ESTIMATED AVERAGE GLUCOSE: 120 MG/DL
GLUCOSE QUALITATIVE U: NEGATIVE MG/DL
GLUCOSE SERPL-MCNC: 94 MG/DL
HBA1C MFR BLD HPLC: 5.8 %
HDLC SERPL-MCNC: 36 MG/DL
HYALINE CASTS: PRESENT
KETONES URINE: ABNORMAL MG/DL
LDLC SERPL CALC-MCNC: 118 MG/DL
LDLC SERPL DIRECT ASSAY-MCNC: 137 MG/DL
LEUKOCYTE ESTERASE URINE: NEGATIVE
MAGNESIUM SERPL-MCNC: 2.2 MG/DL
MICROSCOPIC-UA: NORMAL
MUCUS: PRESENT
NITRITE URINE: NEGATIVE
NONHDLC SERPL-MCNC: 160 MG/DL
PH URINE: 5
POTASSIUM SERPL-SCNC: 3.4 MMOL/L
PROT SERPL-MCNC: 7.2 G/DL
PROTEIN URINE: NEGATIVE MG/DL
RED BLOOD CELLS URINE: 2 /HPF
REVIEW: NORMAL
SODIUM SERPL-SCNC: 144 MMOL/L
SPECIFIC GRAVITY URINE: 1.02
T3 SERPL-MCNC: 123 NG/DL
T4 FREE SERPL-MCNC: 1 NG/DL
T4 SERPL-MCNC: 6.6 UG/DL
TRIGL SERPL-MCNC: 214 MG/DL
TSH SERPL-ACNC: 3.55 UIU/ML
URATE SERPL-MCNC: 4.2 MG/DL
UROBILINOGEN URINE: 0.2 MG/DL
WHITE BLOOD CELLS URINE: 1 /HPF
YEAST-LIKE CELLS: PRESENT

## 2023-06-15 ENCOUNTER — APPOINTMENT (OUTPATIENT)
Dept: CV DIAGNOSITCS | Facility: HOSPITAL | Age: 68
End: 2023-06-15

## 2023-06-15 ENCOUNTER — OUTPATIENT (OUTPATIENT)
Dept: OUTPATIENT SERVICES | Facility: HOSPITAL | Age: 68
LOS: 1 days | End: 2023-06-15
Payer: MEDICARE

## 2023-06-15 DIAGNOSIS — F17.200 NICOTINE DEPENDENCE, UNSPECIFIED, UNCOMPLICATED: ICD-10-CM

## 2023-06-15 DIAGNOSIS — R73.09 OTHER ABNORMAL GLUCOSE: ICD-10-CM

## 2023-06-15 DIAGNOSIS — E78.5 HYPERLIPIDEMIA, UNSPECIFIED: ICD-10-CM

## 2023-06-15 PROCEDURE — 93880 EXTRACRANIAL BILAT STUDY: CPT | Mod: 26

## 2023-06-15 PROCEDURE — 93923 UPR/LXTR ART STDY 3+ LVLS: CPT | Mod: 26

## 2023-06-27 ENCOUNTER — NON-APPOINTMENT (OUTPATIENT)
Age: 68
End: 2023-06-27

## 2023-09-13 ENCOUNTER — APPOINTMENT (OUTPATIENT)
Dept: CT IMAGING | Facility: CLINIC | Age: 68
End: 2023-09-13

## 2023-09-13 ENCOUNTER — NON-APPOINTMENT (OUTPATIENT)
Age: 68
End: 2023-09-13

## 2023-09-13 ENCOUNTER — OUTPATIENT (OUTPATIENT)
Dept: OUTPATIENT SERVICES | Facility: HOSPITAL | Age: 68
LOS: 1 days | End: 2023-09-13
Payer: MEDICARE

## 2023-09-13 PROCEDURE — 75574 CT ANGIO HRT W/3D IMAGE: CPT | Mod: 26,MH

## 2024-10-25 ENCOUNTER — NON-APPOINTMENT (OUTPATIENT)
Age: 69
End: 2024-10-25

## 2024-10-25 ENCOUNTER — APPOINTMENT (OUTPATIENT)
Facility: CLINIC | Age: 69
End: 2024-10-25

## 2024-10-25 PROCEDURE — 99204 OFFICE O/P NEW MOD 45 MIN: CPT

## 2024-10-25 PROCEDURE — 92134 CPTRZ OPH DX IMG PST SGM RTA: CPT

## 2024-10-25 PROCEDURE — 92201 OPSCPY EXTND RTA DRAW UNI/BI: CPT

## 2024-11-06 ENCOUNTER — NON-APPOINTMENT (OUTPATIENT)
Age: 69
End: 2024-11-06

## 2024-11-06 ENCOUNTER — APPOINTMENT (OUTPATIENT)
Facility: CLINIC | Age: 69
End: 2024-11-06
Payer: MEDICARE

## 2024-11-06 PROCEDURE — 92134 CPTRZ OPH DX IMG PST SGM RTA: CPT

## 2024-11-06 PROCEDURE — 67145 PROPH RTA DTCHMNT PC: CPT | Mod: RT

## 2024-11-06 PROCEDURE — 99214 OFFICE O/P EST MOD 30 MIN: CPT | Mod: 25

## 2024-12-18 ENCOUNTER — APPOINTMENT (OUTPATIENT)
Facility: CLINIC | Age: 69
End: 2024-12-18

## 2025-02-12 ENCOUNTER — APPOINTMENT (OUTPATIENT)
Dept: CARDIOLOGY | Facility: CLINIC | Age: 70
End: 2025-02-12

## 2025-06-25 ENCOUNTER — APPOINTMENT (OUTPATIENT)
Dept: CARDIOLOGY | Facility: CLINIC | Age: 70
End: 2025-06-25
Payer: MEDICARE

## 2025-06-25 VITALS
HEIGHT: 67 IN | DIASTOLIC BLOOD PRESSURE: 57 MMHG | HEART RATE: 54 BPM | OXYGEN SATURATION: 100 % | SYSTOLIC BLOOD PRESSURE: 100 MMHG | BODY MASS INDEX: 23.39 KG/M2 | RESPIRATION RATE: 16 BRPM | WEIGHT: 149 LBS

## 2025-06-25 VITALS — HEART RATE: 58 BPM | SYSTOLIC BLOOD PRESSURE: 93 MMHG | OXYGEN SATURATION: 100 % | DIASTOLIC BLOOD PRESSURE: 60 MMHG

## 2025-06-25 VITALS — SYSTOLIC BLOOD PRESSURE: 92 MMHG | DIASTOLIC BLOOD PRESSURE: 53 MMHG | HEART RATE: 56 BPM | OXYGEN SATURATION: 100 %

## 2025-06-25 PROCEDURE — 99215 OFFICE O/P EST HI 40 MIN: CPT

## 2025-06-25 PROCEDURE — 99407 BEHAV CHNG SMOKING > 10 MIN: CPT | Mod: 25

## 2025-06-25 PROCEDURE — 93000 ELECTROCARDIOGRAM COMPLETE: CPT

## 2025-06-25 PROCEDURE — 93040 RHYTHM ECG WITH REPORT: CPT | Mod: 59

## 2025-06-25 PROCEDURE — G2212 PROLONG OUTPT/OFFICE VIS: CPT

## 2025-06-25 RX ORDER — BUPROPION HYDROCHLORIDE 200 MG/1
200 TABLET, FILM COATED ORAL DAILY
Qty: 90 | Refills: 3 | Status: DISCONTINUED | COMMUNITY
Start: 2025-06-25 | End: 2025-06-25

## 2025-06-25 RX ORDER — EVOLOCUMAB 140 MG/ML
140 INJECTION, SOLUTION SUBCUTANEOUS
Qty: 6 | Refills: 3 | Status: ACTIVE | COMMUNITY
Start: 2025-06-25 | End: 1900-01-01

## 2025-07-24 ENCOUNTER — NON-APPOINTMENT (OUTPATIENT)
Age: 70
End: 2025-07-24

## 2025-07-24 DIAGNOSIS — F17.200 NICOTINE DEPENDENCE, UNSPECIFIED, UNCOMPLICATED: ICD-10-CM

## 2025-07-24 DIAGNOSIS — Z91.148 PATIENT'S OTHER NONCOMPLIANCE WITH MEDICATION REGIMEN FOR OTHER REASON: ICD-10-CM

## 2025-07-24 DIAGNOSIS — Z78.9 OTHER SPECIFIED HEALTH STATUS: ICD-10-CM

## 2025-07-24 DIAGNOSIS — R00.2 PALPITATIONS: ICD-10-CM
